# Patient Record
Sex: MALE | Race: OTHER | HISPANIC OR LATINO | URBAN - METROPOLITAN AREA
[De-identification: names, ages, dates, MRNs, and addresses within clinical notes are randomized per-mention and may not be internally consistent; named-entity substitution may affect disease eponyms.]

---

## 2017-01-27 ENCOUNTER — OUTPATIENT (OUTPATIENT)
Dept: OUTPATIENT SERVICES | Facility: HOSPITAL | Age: 37
LOS: 1 days | End: 2017-01-27

## 2017-01-27 ENCOUNTER — APPOINTMENT (OUTPATIENT)
Dept: OPHTHALMOLOGY | Facility: CLINIC | Age: 37
End: 2017-01-27

## 2017-02-03 ENCOUNTER — APPOINTMENT (OUTPATIENT)
Dept: OPHTHALMOLOGY | Facility: CLINIC | Age: 37
End: 2017-02-03

## 2017-02-08 DIAGNOSIS — H04.123 DRY EYE SYNDROME OF BILATERAL LACRIMAL GLANDS: ICD-10-CM

## 2017-04-13 ENCOUNTER — APPOINTMENT (OUTPATIENT)
Dept: OPHTHALMOLOGY | Facility: CLINIC | Age: 37
End: 2017-04-13

## 2017-04-13 ENCOUNTER — OUTPATIENT (OUTPATIENT)
Dept: OUTPATIENT SERVICES | Facility: HOSPITAL | Age: 37
LOS: 1 days | End: 2017-04-13

## 2017-07-21 DIAGNOSIS — H40.003 PREGLAUCOMA, UNSPECIFIED, BILATERAL: ICD-10-CM

## 2017-09-29 ENCOUNTER — OUTPATIENT (OUTPATIENT)
Dept: OUTPATIENT SERVICES | Facility: HOSPITAL | Age: 37
LOS: 1 days | End: 2017-09-29
Payer: SELF-PAY

## 2017-09-29 ENCOUNTER — APPOINTMENT (OUTPATIENT)
Dept: INTERNAL MEDICINE | Facility: CLINIC | Age: 37
End: 2017-09-29

## 2017-09-29 VITALS
DIASTOLIC BLOOD PRESSURE: 75 MMHG | HEART RATE: 72 BPM | TEMPERATURE: 99.3 F | BODY MASS INDEX: 26.07 KG/M2 | HEIGHT: 68 IN | RESPIRATION RATE: 14 BRPM | WEIGHT: 172 LBS | SYSTOLIC BLOOD PRESSURE: 122 MMHG

## 2017-09-29 DIAGNOSIS — I10 ESSENTIAL (PRIMARY) HYPERTENSION: ICD-10-CM

## 2017-09-29 PROCEDURE — G0463: CPT

## 2017-10-06 DIAGNOSIS — L02.31 CUTANEOUS ABSCESS OF BUTTOCK: ICD-10-CM

## 2017-10-06 DIAGNOSIS — I88.9 NONSPECIFIC LYMPHADENITIS, UNSPECIFIED: ICD-10-CM

## 2017-10-09 ENCOUNTER — EMERGENCY (EMERGENCY)
Facility: HOSPITAL | Age: 37
LOS: 1 days | Discharge: HOME | End: 2017-10-09
Attending: EMERGENCY MEDICINE | Admitting: EMERGENCY MEDICINE
Payer: MEDICAID

## 2017-10-09 VITALS
TEMPERATURE: 99 F | RESPIRATION RATE: 20 BRPM | HEART RATE: 79 BPM | SYSTOLIC BLOOD PRESSURE: 121 MMHG | OXYGEN SATURATION: 97 % | DIASTOLIC BLOOD PRESSURE: 62 MMHG

## 2017-10-09 VITALS
HEART RATE: 107 BPM | TEMPERATURE: 99 F | WEIGHT: 182.1 LBS | SYSTOLIC BLOOD PRESSURE: 127 MMHG | OXYGEN SATURATION: 100 % | RESPIRATION RATE: 16 BRPM | DIASTOLIC BLOOD PRESSURE: 83 MMHG

## 2017-10-09 LAB
ALBUMIN SERPL ELPH-MCNC: 4.7 G/DL — SIGNIFICANT CHANGE UP (ref 3.3–5)
ALP SERPL-CCNC: 91 U/L — SIGNIFICANT CHANGE UP (ref 40–120)
ALT FLD-CCNC: 25 U/L RC — SIGNIFICANT CHANGE UP (ref 10–45)
ANION GAP SERPL CALC-SCNC: 15 MMOL/L — SIGNIFICANT CHANGE UP (ref 5–17)
APPEARANCE UR: ABNORMAL
AST SERPL-CCNC: 18 U/L — SIGNIFICANT CHANGE UP (ref 10–40)
BACTERIA # UR AUTO: ABNORMAL /HPF
BASOPHILS # BLD AUTO: 0 K/UL — SIGNIFICANT CHANGE UP (ref 0–0.2)
BASOPHILS NFR BLD AUTO: 0 % — SIGNIFICANT CHANGE UP (ref 0–2)
BILIRUB SERPL-MCNC: 0.4 MG/DL — SIGNIFICANT CHANGE UP (ref 0.2–1.2)
BILIRUB UR-MCNC: NEGATIVE — SIGNIFICANT CHANGE UP
BUN SERPL-MCNC: 14 MG/DL — SIGNIFICANT CHANGE UP (ref 7–23)
CALCIUM SERPL-MCNC: 9.8 MG/DL — SIGNIFICANT CHANGE UP (ref 8.4–10.5)
CHLORIDE SERPL-SCNC: 99 MMOL/L — SIGNIFICANT CHANGE UP (ref 96–108)
CO2 SERPL-SCNC: 27 MMOL/L — SIGNIFICANT CHANGE UP (ref 22–31)
COLOR SPEC: YELLOW — SIGNIFICANT CHANGE UP
CREAT SERPL-MCNC: 0.98 MG/DL — SIGNIFICANT CHANGE UP (ref 0.5–1.3)
DIFF PNL FLD: ABNORMAL
EOSINOPHIL # BLD AUTO: 0.1 K/UL — SIGNIFICANT CHANGE UP (ref 0–0.5)
EOSINOPHIL NFR BLD AUTO: 0.4 % — SIGNIFICANT CHANGE UP (ref 0–6)
EPI CELLS # UR: SIGNIFICANT CHANGE UP /HPF
GAS PNL BLDV: SIGNIFICANT CHANGE UP
GLUCOSE SERPL-MCNC: 99 MG/DL — SIGNIFICANT CHANGE UP (ref 70–99)
GLUCOSE UR QL: NEGATIVE — SIGNIFICANT CHANGE UP
HCT VFR BLD CALC: 45.1 % — SIGNIFICANT CHANGE UP (ref 39–50)
HGB BLD-MCNC: 16 G/DL — SIGNIFICANT CHANGE UP (ref 13–17)
KETONES UR-MCNC: NEGATIVE — SIGNIFICANT CHANGE UP
LEUKOCYTE ESTERASE UR-ACNC: ABNORMAL
LYMPHOCYTES # BLD AUTO: 1.9 K/UL — SIGNIFICANT CHANGE UP (ref 1–3.3)
LYMPHOCYTES # BLD AUTO: 9.7 % — LOW (ref 13–44)
MCHC RBC-ENTMCNC: 32.3 PG — SIGNIFICANT CHANGE UP (ref 27–34)
MCHC RBC-ENTMCNC: 35.5 GM/DL — SIGNIFICANT CHANGE UP (ref 32–36)
MCV RBC AUTO: 91.2 FL — SIGNIFICANT CHANGE UP (ref 80–100)
MONOCYTES # BLD AUTO: 1.2 K/UL — HIGH (ref 0–0.9)
MONOCYTES NFR BLD AUTO: 5.9 % — SIGNIFICANT CHANGE UP (ref 2–14)
NEUTROPHILS # BLD AUTO: 16.5 K/UL — HIGH (ref 1.8–7.4)
NEUTROPHILS NFR BLD AUTO: 84 % — HIGH (ref 43–77)
NITRITE UR-MCNC: NEGATIVE — SIGNIFICANT CHANGE UP
PH UR: 6.5 — SIGNIFICANT CHANGE UP (ref 5–8)
PLATELET # BLD AUTO: 115 K/UL — LOW (ref 150–400)
POTASSIUM SERPL-MCNC: 4.2 MMOL/L — SIGNIFICANT CHANGE UP (ref 3.5–5.3)
POTASSIUM SERPL-SCNC: 4.2 MMOL/L — SIGNIFICANT CHANGE UP (ref 3.5–5.3)
PROT SERPL-MCNC: 7.9 G/DL — SIGNIFICANT CHANGE UP (ref 6–8.3)
PROT UR-MCNC: 150 MG/DL
RBC # BLD: 4.94 M/UL — SIGNIFICANT CHANGE UP (ref 4.2–5.8)
RBC # FLD: 11.4 % — SIGNIFICANT CHANGE UP (ref 10.3–14.5)
RBC CASTS # UR COMP ASSIST: ABNORMAL /HPF (ref 0–2)
SODIUM SERPL-SCNC: 141 MMOL/L — SIGNIFICANT CHANGE UP (ref 135–145)
SP GR SPEC: 1.02 — SIGNIFICANT CHANGE UP (ref 1.01–1.02)
UROBILINOGEN FLD QL: NEGATIVE — SIGNIFICANT CHANGE UP
WBC # BLD: 18.3 K/UL — HIGH (ref 3.8–10.5)
WBC # FLD AUTO: 18.3 K/UL — HIGH (ref 3.8–10.5)
WBC UR QL: >50 /HPF (ref 0–5)

## 2017-10-09 PROCEDURE — 84295 ASSAY OF SERUM SODIUM: CPT

## 2017-10-09 PROCEDURE — 96372 THER/PROPH/DIAG INJ SC/IM: CPT

## 2017-10-09 PROCEDURE — 74176 CT ABD & PELVIS W/O CONTRAST: CPT | Mod: 26

## 2017-10-09 PROCEDURE — 85027 COMPLETE CBC AUTOMATED: CPT

## 2017-10-09 PROCEDURE — 82435 ASSAY OF BLOOD CHLORIDE: CPT

## 2017-10-09 PROCEDURE — 81001 URINALYSIS AUTO W/SCOPE: CPT

## 2017-10-09 PROCEDURE — 85014 HEMATOCRIT: CPT

## 2017-10-09 PROCEDURE — 87389 HIV-1 AG W/HIV-1&-2 AB AG IA: CPT

## 2017-10-09 PROCEDURE — 74176 CT ABD & PELVIS W/O CONTRAST: CPT

## 2017-10-09 PROCEDURE — 83605 ASSAY OF LACTIC ACID: CPT

## 2017-10-09 PROCEDURE — 99285 EMERGENCY DEPT VISIT HI MDM: CPT

## 2017-10-09 PROCEDURE — 85049 AUTOMATED PLATELET COUNT: CPT

## 2017-10-09 PROCEDURE — 80053 COMPREHEN METABOLIC PANEL: CPT

## 2017-10-09 PROCEDURE — 82947 ASSAY GLUCOSE BLOOD QUANT: CPT

## 2017-10-09 PROCEDURE — 82803 BLOOD GASES ANY COMBINATION: CPT

## 2017-10-09 PROCEDURE — 99284 EMERGENCY DEPT VISIT MOD MDM: CPT | Mod: 25

## 2017-10-09 PROCEDURE — 84132 ASSAY OF SERUM POTASSIUM: CPT

## 2017-10-09 PROCEDURE — 86780 TREPONEMA PALLIDUM: CPT

## 2017-10-09 PROCEDURE — 82330 ASSAY OF CALCIUM: CPT

## 2017-10-09 PROCEDURE — 87186 SC STD MICRODIL/AGAR DIL: CPT

## 2017-10-09 PROCEDURE — 87086 URINE CULTURE/COLONY COUNT: CPT

## 2017-10-09 RX ORDER — SODIUM CHLORIDE 9 MG/ML
1000 INJECTION INTRAMUSCULAR; INTRAVENOUS; SUBCUTANEOUS ONCE
Qty: 0 | Refills: 0 | Status: COMPLETED | OUTPATIENT
Start: 2017-10-09 | End: 2017-10-09

## 2017-10-09 RX ORDER — AZITHROMYCIN 500 MG/1
1000 TABLET, FILM COATED ORAL ONCE
Qty: 0 | Refills: 0 | Status: COMPLETED | OUTPATIENT
Start: 2017-10-09 | End: 2017-10-09

## 2017-10-09 RX ORDER — CEFTRIAXONE 500 MG/1
250 INJECTION, POWDER, FOR SOLUTION INTRAMUSCULAR; INTRAVENOUS ONCE
Qty: 0 | Refills: 0 | Status: COMPLETED | OUTPATIENT
Start: 2017-10-09 | End: 2017-10-09

## 2017-10-09 RX ADMIN — CEFTRIAXONE 250 MILLIGRAM(S): 500 INJECTION, POWDER, FOR SOLUTION INTRAMUSCULAR; INTRAVENOUS at 20:54

## 2017-10-09 RX ADMIN — AZITHROMYCIN 1000 MILLIGRAM(S): 500 TABLET, FILM COATED ORAL at 20:53

## 2017-10-09 RX ADMIN — SODIUM CHLORIDE 1000 MILLILITER(S): 9 INJECTION INTRAMUSCULAR; INTRAVENOUS; SUBCUTANEOUS at 18:47

## 2017-10-09 NOTE — ED PROCEDURE NOTE - PROCEDURE ADDITIONAL DETAILS
Emergency Department Focused Ultrasound performed at patient's bedside for educational purposes. The study will have a follow up study performed or was performed in the direct supervision of an ultrasound trained attending.     Thickened bladder wall. no hydro. No signs appendicitis RLQ.

## 2017-10-09 NOTE — ED PROVIDER NOTE - PHYSICAL EXAMINATION
Gen: WDWN, NAD  HEENT: EOMI, no nasal discharge, mucous membranes moist  CV: regular but tachycardic, +S1/S2, no M/R/G  Resp: CTAB, no W/R/R  GI: Abdomen soft non-distended, mild tenderness to palpation suprapubic, no masses  : No gross deformity of the penis or testicles, no pain with palpation of bilateral testicles, prominent left inguinal lymphadenopathy along entire inguinal chain, no open lesions or sores, no active penile discharge, left buttock with mild abrasion without fluctuance or induration  MSK: No open wounds, no bruising, no LE edema  Neuro: A&Ox4, following commands, moving all four extremities spontaneously  Psych: appropriate mood, denies AH, VH, SI

## 2017-10-09 NOTE — ED PROVIDER NOTE - ATTENDING CONTRIBUTION TO CARE
pt with dysuria ad lower abd pain/malaise  on exam, ill appearing. lower abd mild discomfort.   likely UTI. ct r/o infected stone. pt signed out with results pending.

## 2017-10-09 NOTE — ED ADULT NURSE NOTE - OBJECTIVE STATEMENT
38 yo male presents to ED with several hours penile discharge, hematuria, and burning urination. Patient also reporting left groin pain and tenderness to palpation, +swollen and tender lymph nodes present down left groin. Patient recently prescribed bactrim to wound on left buttock that is healing well. +Chills, +one episode of vomiting yesterday. No chest pain or SOB. No abdominal pain, no diarrhea. No rashes, no ulcerations, no testicular pain or swelling. Patient is , sexually active only with wife.

## 2017-10-09 NOTE — ED PROVIDER NOTE - MEDICAL DECISION MAKING DETAILS
patient with discharge and UA concerning for urinary infection vs STD, patient was treated empirically for STD, clumped platelets were resent in blue top and still clumped, hematology said that the patient will be fine to follow up regarding this as an outpatient.

## 2017-10-09 NOTE — ED PROVIDER NOTE - NS ED ROS FT
Gen: Denies fever, weight loss  CV: Denies chest pain, palpitations  Skin: Denies rash, erythema, color changes  Resp: Denies SOB, cough  Endo: Denies sensitivity to heat, cold, increased urination  GI: Admits to vomiting, Denies constipation, nausea  Msk: Denies back pain, LE swelling, extremity pain  : Admits to dysuria, increased frequency  Neuro: Denies LOC, weakness, seizures  Psych: Denies hx of psych, hallucinations

## 2017-10-09 NOTE — ED PROVIDER NOTE - OBJECTIVE STATEMENT
Patient is a 37 year old male with no PMHx who presents with pain in his penis, burning with urination, hematuria, and penile discharge for 4 hours. He states that 5 years ago he had a similar problem but doesn't remember what the cause was. 2 weeks ago he was seen in urgent care for a lesion on his right buttock and was given an rx for bactrim. He admits to vomiting x 1 an hour ago. He denies fever, chest pain, SOB, or joint pain. When the patient's wife leaves the room he states that his wife is his only sexual partner and that he does not use any drugs or have any other high risk activity.

## 2017-10-10 ENCOUNTER — INPATIENT (INPATIENT)
Facility: HOSPITAL | Age: 37
LOS: 2 days | Discharge: HOME | DRG: 872 | End: 2017-10-13
Attending: HOSPITALIST | Admitting: STUDENT IN AN ORGANIZED HEALTH CARE EDUCATION/TRAINING PROGRAM
Payer: MEDICAID

## 2017-10-10 VITALS
SYSTOLIC BLOOD PRESSURE: 130 MMHG | HEART RATE: 122 BPM | TEMPERATURE: 101 F | RESPIRATION RATE: 19 BRPM | DIASTOLIC BLOOD PRESSURE: 40 MMHG | OXYGEN SATURATION: 99 %

## 2017-10-10 LAB
ALBUMIN SERPL ELPH-MCNC: 4.3 G/DL — SIGNIFICANT CHANGE UP (ref 3.3–5)
ALP SERPL-CCNC: 85 U/L — SIGNIFICANT CHANGE UP (ref 40–120)
ALT FLD-CCNC: 24 U/L RC — SIGNIFICANT CHANGE UP (ref 10–45)
ANION GAP SERPL CALC-SCNC: 14 MMOL/L — SIGNIFICANT CHANGE UP (ref 5–17)
AST SERPL-CCNC: 25 U/L — SIGNIFICANT CHANGE UP (ref 10–40)
BASOPHILS # BLD AUTO: 0 K/UL — SIGNIFICANT CHANGE UP (ref 0–0.2)
BASOPHILS NFR BLD AUTO: 0.1 % — SIGNIFICANT CHANGE UP (ref 0–2)
BILIRUB SERPL-MCNC: 0.8 MG/DL — SIGNIFICANT CHANGE UP (ref 0.2–1.2)
BUN SERPL-MCNC: 12 MG/DL — SIGNIFICANT CHANGE UP (ref 7–23)
C TRACH RRNA SPEC QL NAA+PROBE: SIGNIFICANT CHANGE UP
CALCIUM SERPL-MCNC: 9.4 MG/DL — SIGNIFICANT CHANGE UP (ref 8.4–10.5)
CHLORIDE SERPL-SCNC: 100 MMOL/L — SIGNIFICANT CHANGE UP (ref 96–108)
CO2 SERPL-SCNC: 24 MMOL/L — SIGNIFICANT CHANGE UP (ref 22–31)
CREAT SERPL-MCNC: 1.16 MG/DL — SIGNIFICANT CHANGE UP (ref 0.5–1.3)
EOSINOPHIL # BLD AUTO: 0 K/UL — SIGNIFICANT CHANGE UP (ref 0–0.5)
EOSINOPHIL NFR BLD AUTO: 0.2 % — SIGNIFICANT CHANGE UP (ref 0–6)
GAS PNL BLDV: SIGNIFICANT CHANGE UP
GLUCOSE SERPL-MCNC: 110 MG/DL — HIGH (ref 70–99)
HCT VFR BLD CALC: 43.3 % — SIGNIFICANT CHANGE UP (ref 39–50)
HGB BLD-MCNC: 14.9 G/DL — SIGNIFICANT CHANGE UP (ref 13–17)
HIV 1+2 AB+HIV1 P24 AG SERPL QL IA: SIGNIFICANT CHANGE UP
LYMPHOCYTES # BLD AUTO: 1.7 K/UL — SIGNIFICANT CHANGE UP (ref 1–3.3)
LYMPHOCYTES # BLD AUTO: 8.8 % — LOW (ref 13–44)
MCHC RBC-ENTMCNC: 31.5 PG — SIGNIFICANT CHANGE UP (ref 27–34)
MCHC RBC-ENTMCNC: 34.4 GM/DL — SIGNIFICANT CHANGE UP (ref 32–36)
MCV RBC AUTO: 91.5 FL — SIGNIFICANT CHANGE UP (ref 80–100)
MONOCYTES # BLD AUTO: 1.6 K/UL — HIGH (ref 0–0.9)
MONOCYTES NFR BLD AUTO: 8.3 % — SIGNIFICANT CHANGE UP (ref 2–14)
N GONORRHOEA RRNA SPEC QL NAA+PROBE: SIGNIFICANT CHANGE UP
NEUTROPHILS # BLD AUTO: 16.1 K/UL — HIGH (ref 1.8–7.4)
NEUTROPHILS NFR BLD AUTO: 82.7 % — HIGH (ref 43–77)
PLATELET # BLD AUTO: ABNORMAL (ref 150–400)
POTASSIUM SERPL-MCNC: 4.2 MMOL/L — SIGNIFICANT CHANGE UP (ref 3.5–5.3)
POTASSIUM SERPL-SCNC: 4.2 MMOL/L — SIGNIFICANT CHANGE UP (ref 3.5–5.3)
PROT SERPL-MCNC: 8 G/DL — SIGNIFICANT CHANGE UP (ref 6–8.3)
RBC # BLD: 4.73 M/UL — SIGNIFICANT CHANGE UP (ref 4.2–5.8)
RBC # FLD: 11.6 % — SIGNIFICANT CHANGE UP (ref 10.3–14.5)
SODIUM SERPL-SCNC: 138 MMOL/L — SIGNIFICANT CHANGE UP (ref 135–145)
SPECIMEN SOURCE: SIGNIFICANT CHANGE UP
WBC # BLD: 19.5 K/UL — HIGH (ref 3.8–10.5)
WBC # FLD AUTO: 19.5 K/UL — HIGH (ref 3.8–10.5)

## 2017-10-10 PROCEDURE — 76775 US EXAM ABDO BACK WALL LIM: CPT | Mod: 26

## 2017-10-10 PROCEDURE — 99285 EMERGENCY DEPT VISIT HI MDM: CPT

## 2017-10-10 RX ORDER — SODIUM CHLORIDE 9 MG/ML
1000 INJECTION INTRAMUSCULAR; INTRAVENOUS; SUBCUTANEOUS ONCE
Qty: 0 | Refills: 0 | Status: COMPLETED | OUTPATIENT
Start: 2017-10-10 | End: 2017-10-10

## 2017-10-10 RX ORDER — CEFTRIAXONE 500 MG/1
1 INJECTION, POWDER, FOR SOLUTION INTRAMUSCULAR; INTRAVENOUS EVERY 12 HOURS
Qty: 0 | Refills: 0 | Status: DISCONTINUED | OUTPATIENT
Start: 2017-10-10 | End: 2017-10-11

## 2017-10-10 RX ORDER — SODIUM CHLORIDE 9 MG/ML
1000 INJECTION INTRAMUSCULAR; INTRAVENOUS; SUBCUTANEOUS
Qty: 0 | Refills: 0 | Status: DISCONTINUED | OUTPATIENT
Start: 2017-10-10 | End: 2017-10-11

## 2017-10-10 RX ORDER — CEFTRIAXONE 500 MG/1
1 INJECTION, POWDER, FOR SOLUTION INTRAMUSCULAR; INTRAVENOUS ONCE
Qty: 0 | Refills: 0 | Status: COMPLETED | OUTPATIENT
Start: 2017-10-10 | End: 2017-10-10

## 2017-10-10 RX ORDER — ONDANSETRON 8 MG/1
4 TABLET, FILM COATED ORAL ONCE
Qty: 0 | Refills: 0 | Status: COMPLETED | OUTPATIENT
Start: 2017-10-10 | End: 2017-10-10

## 2017-10-10 RX ORDER — KETOROLAC TROMETHAMINE 30 MG/ML
15 SYRINGE (ML) INJECTION ONCE
Qty: 0 | Refills: 0 | Status: DISCONTINUED | OUTPATIENT
Start: 2017-10-10 | End: 2017-10-10

## 2017-10-10 RX ORDER — ACETAMINOPHEN 500 MG
1000 TABLET ORAL EVERY 6 HOURS
Qty: 0 | Refills: 0 | Status: COMPLETED | OUTPATIENT
Start: 2017-10-10 | End: 2017-10-11

## 2017-10-10 RX ORDER — KETOROLAC TROMETHAMINE 30 MG/ML
30 SYRINGE (ML) INJECTION EVERY 8 HOURS
Qty: 0 | Refills: 0 | Status: DISCONTINUED | OUTPATIENT
Start: 2017-10-10 | End: 2017-10-11

## 2017-10-10 RX ORDER — ACETAMINOPHEN 500 MG
1000 TABLET ORAL ONCE
Qty: 0 | Refills: 0 | Status: COMPLETED | OUTPATIENT
Start: 2017-10-10 | End: 2017-10-10

## 2017-10-10 RX ADMIN — Medication 15 MILLIGRAM(S): at 23:15

## 2017-10-10 RX ADMIN — CEFTRIAXONE 100 GRAM(S): 500 INJECTION, POWDER, FOR SOLUTION INTRAMUSCULAR; INTRAVENOUS at 22:17

## 2017-10-10 RX ADMIN — Medication 400 MILLIGRAM(S): at 21:43

## 2017-10-10 RX ADMIN — SODIUM CHLORIDE 1000 MILLILITER(S): 9 INJECTION INTRAMUSCULAR; INTRAVENOUS; SUBCUTANEOUS at 23:14

## 2017-10-10 RX ADMIN — SODIUM CHLORIDE 1000 MILLILITER(S): 9 INJECTION INTRAMUSCULAR; INTRAVENOUS; SUBCUTANEOUS at 21:44

## 2017-10-10 RX ADMIN — ONDANSETRON 4 MILLIGRAM(S): 8 TABLET, FILM COATED ORAL at 21:43

## 2017-10-10 RX ADMIN — SODIUM CHLORIDE 1000 MILLILITER(S): 9 INJECTION INTRAMUSCULAR; INTRAVENOUS; SUBCUTANEOUS at 22:38

## 2017-10-10 RX ADMIN — Medication 15 MILLIGRAM(S): at 23:14

## 2017-10-10 RX ADMIN — Medication 15 MILLIGRAM(S): at 23:59

## 2017-10-10 RX ADMIN — Medication 15 MILLIGRAM(S): at 22:17

## 2017-10-10 NOTE — ED PROVIDER NOTE - MEDICAL DECISION MAKING DETAILS
Attending Cuellar: 38 y/o male h/o UTI in the past presenting with fever and dysuria. upon arrival to the ed pt febrile with suprapubic discomfort. pt also with vomiting and diarrhea. abd soft on exam. pt had ct abd/pel yesterday which was negative for renal stone making septic stone unlikely. suspect pyelonephritis. plan for continued IVF and abx. no testicular pain or evidence of demetris Attending Cuellar: 36 y/o male h/o UTI in the past presenting with fever and dysuria. upon arrival to the ed pt febrile with suprapubic discomfort. pt also with vomiting and diarrhea. abd soft on exam. pt had ct abd/pel yesterday which was negative for renal stone making septic stone unlikely. suspect pyelonephritis. plan for continued IVF and abx. no testicular pain or evidence of demetris. pt states has had uti in the past.  no h/o ureteral reflux. not h/o immunosuppression

## 2017-10-10 NOTE — ED PROVIDER NOTE - PHYSICAL EXAMINATION
Attending Cuellar: Gen: NAD, heent: atrauamtic, eomi, perrla, mmm, op pink, uvula midline, neck; nttp, no nuchal rigidity, chest: nttp, no crepitus, cv: rrr, no murmurs, lungs: ctab, abd: soft, nontender, nondistended, no peritoneal signs, +BS, no guarding, ext: wwp, neg homans, skin: no rash, neuro: awake and alert, following commands, speech clear, sensation and strength intact, no focal deficits : no testicular pain or ttp. no crepitus. circumszied

## 2017-10-10 NOTE — ED PROVIDER NOTE - PROGRESS NOTE DETAILS
Attending Polo: pt placed in the cdu for pyelonephritis. on re-exam pt more ill appearing. has received 4L IVF with persistent hypotension. pt will need admission, continued aggressive hydration and abx. will repeat lactate. plan to admit

## 2017-10-10 NOTE — ED ADULT NURSE NOTE - OBJECTIVE STATEMENT
37  year old  male aox3 ambulatory wife at bedside presnting to ed with generalized malaise, body aches fever and difficulty urinating x couple of days. patient states he was seen here yesterday and tx wit "a shot and 2 pills" and was not sent home on po abts. 37  year old  male aox3 ambulatory wife at bedside presenting to ed with generalized malaise, body aches fever and difficulty urinating x couple of days. patient states he was seen here yesterday and tx wit "a shot and 2 pills" and states he was not sent home on po abts. patient states he is continuing to have urinary dysuria, hematuria and frequency as well as worsening body aches. respirations even unlabored no sob/dyspnea abd soft nondistended +bs nontender skin dry warm intact crews equally

## 2017-10-11 DIAGNOSIS — A41.51 SEPSIS DUE TO ESCHERICHIA COLI [E. COLI]: ICD-10-CM

## 2017-10-11 DIAGNOSIS — Z98.890 OTHER SPECIFIED POSTPROCEDURAL STATES: Chronic | ICD-10-CM

## 2017-10-11 DIAGNOSIS — N17.9 ACUTE KIDNEY FAILURE, UNSPECIFIED: ICD-10-CM

## 2017-10-11 DIAGNOSIS — N12 TUBULO-INTERSTITIAL NEPHRITIS, NOT SPECIFIED AS ACUTE OR CHRONIC: ICD-10-CM

## 2017-10-11 DIAGNOSIS — R79.89 OTHER SPECIFIED ABNORMAL FINDINGS OF BLOOD CHEMISTRY: ICD-10-CM

## 2017-10-11 DIAGNOSIS — I95.9 HYPOTENSION, UNSPECIFIED: ICD-10-CM

## 2017-10-11 DIAGNOSIS — A41.9 SEPSIS, UNSPECIFIED ORGANISM: ICD-10-CM

## 2017-10-11 DIAGNOSIS — Z29.9 ENCOUNTER FOR PROPHYLACTIC MEASURES, UNSPECIFIED: ICD-10-CM

## 2017-10-11 LAB
-  AMIKACIN: SIGNIFICANT CHANGE UP
-  AMPICILLIN/SULBACTAM: SIGNIFICANT CHANGE UP
-  AMPICILLIN: SIGNIFICANT CHANGE UP
-  AZTREONAM: SIGNIFICANT CHANGE UP
-  CEFAZOLIN: SIGNIFICANT CHANGE UP
-  CEFEPIME: SIGNIFICANT CHANGE UP
-  CEFOXITIN: SIGNIFICANT CHANGE UP
-  CEFTAZIDIME: SIGNIFICANT CHANGE UP
-  CEFTRIAXONE: SIGNIFICANT CHANGE UP
-  CIPROFLOXACIN: SIGNIFICANT CHANGE UP
-  ERTAPENEM: SIGNIFICANT CHANGE UP
-  GENTAMICIN: SIGNIFICANT CHANGE UP
-  IMIPENEM: SIGNIFICANT CHANGE UP
-  LEVOFLOXACIN: SIGNIFICANT CHANGE UP
-  MEROPENEM: SIGNIFICANT CHANGE UP
-  NITROFURANTOIN: SIGNIFICANT CHANGE UP
-  PIPERACILLIN/TAZOBACTAM: SIGNIFICANT CHANGE UP
-  TOBRAMYCIN: SIGNIFICANT CHANGE UP
-  TRIMETHOPRIM/SULFAMETHOXAZOLE: SIGNIFICANT CHANGE UP
ALBUMIN SERPL ELPH-MCNC: 3.1 G/DL — LOW (ref 3.3–5)
ALBUMIN SERPL ELPH-MCNC: 3.2 G/DL — LOW (ref 3.3–5)
ALP SERPL-CCNC: 64 U/L — SIGNIFICANT CHANGE UP (ref 40–120)
ALP SERPL-CCNC: 87 U/L — SIGNIFICANT CHANGE UP (ref 40–120)
ALT FLD-CCNC: 18 U/L RC — SIGNIFICANT CHANGE UP (ref 10–45)
ALT FLD-CCNC: 22 U/L RC — SIGNIFICANT CHANGE UP (ref 10–45)
AMPHET UR-MCNC: NEGATIVE — SIGNIFICANT CHANGE UP
ANION GAP SERPL CALC-SCNC: 10 MMOL/L — SIGNIFICANT CHANGE UP (ref 5–17)
ANION GAP SERPL CALC-SCNC: 11 MMOL/L — SIGNIFICANT CHANGE UP (ref 5–17)
APPEARANCE UR: CLEAR — SIGNIFICANT CHANGE UP
APTT BLD: 25.6 SEC — LOW (ref 27.5–37.4)
APTT BLD: 28.1 SEC — SIGNIFICANT CHANGE UP (ref 27.5–37.4)
AST SERPL-CCNC: 17 U/L — SIGNIFICANT CHANGE UP (ref 10–40)
AST SERPL-CCNC: 22 U/L — SIGNIFICANT CHANGE UP (ref 10–40)
BACTERIA # UR AUTO: ABNORMAL /HPF
BARBITURATES UR SCN-MCNC: NEGATIVE — SIGNIFICANT CHANGE UP
BASE EXCESS BLDV CALC-SCNC: -3.6 MMOL/L — LOW (ref -2–2)
BASOPHILS # BLD AUTO: 0 K/UL — SIGNIFICANT CHANGE UP (ref 0–0.2)
BASOPHILS NFR BLD AUTO: 0.1 % — SIGNIFICANT CHANGE UP (ref 0–2)
BENZODIAZ UR-MCNC: NEGATIVE — SIGNIFICANT CHANGE UP
BILIRUB SERPL-MCNC: 0.6 MG/DL — SIGNIFICANT CHANGE UP (ref 0.2–1.2)
BILIRUB SERPL-MCNC: 1.5 MG/DL — HIGH (ref 0.2–1.2)
BILIRUB UR-MCNC: NEGATIVE — SIGNIFICANT CHANGE UP
BLD GP AB SCN SERPL QL: NEGATIVE — SIGNIFICANT CHANGE UP
BUN SERPL-MCNC: 10 MG/DL — SIGNIFICANT CHANGE UP (ref 7–23)
BUN SERPL-MCNC: 10 MG/DL — SIGNIFICANT CHANGE UP (ref 7–23)
CA-I SERPL-SCNC: 1.12 MMOL/L — SIGNIFICANT CHANGE UP (ref 1.12–1.3)
CALCIUM SERPL-MCNC: 7.5 MG/DL — LOW (ref 8.4–10.5)
CALCIUM SERPL-MCNC: 7.9 MG/DL — LOW (ref 8.4–10.5)
CHLORIDE BLDV-SCNC: 112 MMOL/L — HIGH (ref 96–108)
CHLORIDE SERPL-SCNC: 108 MMOL/L — SIGNIFICANT CHANGE UP (ref 96–108)
CHLORIDE SERPL-SCNC: 110 MMOL/L — HIGH (ref 96–108)
CO2 BLDV-SCNC: 23 MMOL/L — SIGNIFICANT CHANGE UP (ref 22–30)
CO2 SERPL-SCNC: 20 MMOL/L — LOW (ref 22–31)
CO2 SERPL-SCNC: 21 MMOL/L — LOW (ref 22–31)
COCAINE METAB.OTHER UR-MCNC: NEGATIVE — SIGNIFICANT CHANGE UP
COLOR SPEC: YELLOW — SIGNIFICANT CHANGE UP
COMMENT - URINE: SIGNIFICANT CHANGE UP
CREAT SERPL-MCNC: 0.91 MG/DL — SIGNIFICANT CHANGE UP (ref 0.5–1.3)
CREAT SERPL-MCNC: 0.94 MG/DL — SIGNIFICANT CHANGE UP (ref 0.5–1.3)
CULTURE RESULTS: SIGNIFICANT CHANGE UP
DIFF PNL FLD: ABNORMAL
EOSINOPHIL # BLD AUTO: 0 K/UL — SIGNIFICANT CHANGE UP (ref 0–0.5)
EOSINOPHIL NFR BLD AUTO: 0.1 % — SIGNIFICANT CHANGE UP (ref 0–6)
EPI CELLS # UR: SIGNIFICANT CHANGE UP /HPF
FIBRINOGEN PPP-MCNC: 918 MG/DL — HIGH (ref 310–510)
FSP PPP-MCNC: >=5 <20
GAS PNL BLDV: 139 MMOL/L — SIGNIFICANT CHANGE UP (ref 136–145)
GAS PNL BLDV: SIGNIFICANT CHANGE UP
GLUCOSE BLDV-MCNC: 102 MG/DL — HIGH (ref 70–99)
GLUCOSE SERPL-MCNC: 104 MG/DL — HIGH (ref 70–99)
GLUCOSE SERPL-MCNC: 108 MG/DL — HIGH (ref 70–99)
GLUCOSE UR QL: NEGATIVE — SIGNIFICANT CHANGE UP
HAPTOGLOB SERPL-MCNC: 232 MG/DL — HIGH (ref 34–200)
HCO3 BLDV-SCNC: 22 MMOL/L — SIGNIFICANT CHANGE UP (ref 21–29)
HCT VFR BLD CALC: 35.9 % — LOW (ref 39–50)
HCT VFR BLD CALC: 37.5 % — LOW (ref 39–50)
HCT VFR BLDA CALC: 40 % — SIGNIFICANT CHANGE UP (ref 39–50)
HGB BLD CALC-MCNC: 12.9 G/DL — LOW (ref 13–17)
HGB BLD-MCNC: 12.6 G/DL — LOW (ref 13–17)
HGB BLD-MCNC: 12.7 G/DL — LOW (ref 13–17)
INR BLD: 1.17 RATIO — HIGH (ref 0.88–1.16)
INR BLD: 1.25 RATIO — HIGH (ref 0.88–1.16)
KETONES UR-MCNC: ABNORMAL
LACTATE BLDV-MCNC: 0.7 MMOL/L — SIGNIFICANT CHANGE UP (ref 0.7–2)
LDH SERPL L TO P-CCNC: 291 U/L — HIGH (ref 50–242)
LEUKOCYTE ESTERASE UR-ACNC: ABNORMAL
LYMPHOCYTES # BLD AUTO: 1.4 K/UL — SIGNIFICANT CHANGE UP (ref 1–3.3)
LYMPHOCYTES # BLD AUTO: 9.1 % — LOW (ref 13–44)
MAGNESIUM SERPL-MCNC: 1.6 MG/DL — SIGNIFICANT CHANGE UP (ref 1.6–2.6)
MCHC RBC-ENTMCNC: 31.4 PG — SIGNIFICANT CHANGE UP (ref 27–34)
MCHC RBC-ENTMCNC: 32.3 PG — SIGNIFICANT CHANGE UP (ref 27–34)
MCHC RBC-ENTMCNC: 33.9 GM/DL — SIGNIFICANT CHANGE UP (ref 32–36)
MCHC RBC-ENTMCNC: 35.1 GM/DL — SIGNIFICANT CHANGE UP (ref 32–36)
MCV RBC AUTO: 91.9 FL — SIGNIFICANT CHANGE UP (ref 80–100)
MCV RBC AUTO: 92.5 FL — SIGNIFICANT CHANGE UP (ref 80–100)
METHADONE UR-MCNC: NEGATIVE — SIGNIFICANT CHANGE UP
METHOD TYPE: SIGNIFICANT CHANGE UP
MONOCYTES # BLD AUTO: 0.5 K/UL — SIGNIFICANT CHANGE UP (ref 0–0.9)
MONOCYTES NFR BLD AUTO: 3.4 % — SIGNIFICANT CHANGE UP (ref 2–14)
NEUTROPHILS # BLD AUTO: 13.2 K/UL — HIGH (ref 1.8–7.4)
NEUTROPHILS NFR BLD AUTO: 87.4 % — HIGH (ref 43–77)
NITRITE UR-MCNC: NEGATIVE — SIGNIFICANT CHANGE UP
OPIATES UR-MCNC: NEGATIVE — SIGNIFICANT CHANGE UP
ORGANISM # SPEC MICROSCOPIC CNT: SIGNIFICANT CHANGE UP
ORGANISM # SPEC MICROSCOPIC CNT: SIGNIFICANT CHANGE UP
OXYCODONE UR-MCNC: NEGATIVE — SIGNIFICANT CHANGE UP
PCO2 BLDV: 43 MMHG — SIGNIFICANT CHANGE UP (ref 35–50)
PCP SPEC-MCNC: SIGNIFICANT CHANGE UP
PCP UR-MCNC: NEGATIVE — SIGNIFICANT CHANGE UP
PH BLDV: 7.33 — LOW (ref 7.35–7.45)
PH UR: 6 — SIGNIFICANT CHANGE UP (ref 5–8)
PHOSPHATE SERPL-MCNC: 1.4 MG/DL — LOW (ref 2.5–4.5)
PLATELET # BLD AUTO: ABNORMAL (ref 150–400)
PLATELET # BLD AUTO: ABNORMAL (ref 150–400)
PO2 BLDV: 54 MMHG — HIGH (ref 25–45)
POTASSIUM BLDV-SCNC: 3.5 MMOL/L — SIGNIFICANT CHANGE UP (ref 3.5–5)
POTASSIUM SERPL-MCNC: 3.7 MMOL/L — SIGNIFICANT CHANGE UP (ref 3.5–5.3)
POTASSIUM SERPL-MCNC: 3.8 MMOL/L — SIGNIFICANT CHANGE UP (ref 3.5–5.3)
POTASSIUM SERPL-SCNC: 3.7 MMOL/L — SIGNIFICANT CHANGE UP (ref 3.5–5.3)
POTASSIUM SERPL-SCNC: 3.8 MMOL/L — SIGNIFICANT CHANGE UP (ref 3.5–5.3)
PROT SERPL-MCNC: 5.9 G/DL — LOW (ref 6–8.3)
PROT SERPL-MCNC: 5.9 G/DL — LOW (ref 6–8.3)
PROT UR-MCNC: SIGNIFICANT CHANGE UP
PROTHROM AB SERPL-ACNC: 12.7 SEC — SIGNIFICANT CHANGE UP (ref 9.8–12.7)
PROTHROM AB SERPL-ACNC: 13.7 SEC — HIGH (ref 9.8–12.7)
RAPID RVP RESULT: SIGNIFICANT CHANGE UP
RBC # BLD: 3.91 M/UL — LOW (ref 4.2–5.8)
RBC # BLD: 4.06 M/UL — LOW (ref 4.2–5.8)
RBC # BLD: 4.07 M/UL — LOW (ref 4.2–5.8)
RBC # FLD: 11.5 % — SIGNIFICANT CHANGE UP (ref 10.3–14.5)
RBC # FLD: 11.6 % — SIGNIFICANT CHANGE UP (ref 10.3–14.5)
RBC CASTS # UR COMP ASSIST: SIGNIFICANT CHANGE UP /HPF (ref 0–2)
RETICS #: 76.2 K/UL — SIGNIFICANT CHANGE UP (ref 25–125)
RETICS/RBC NFR: 2 % — SIGNIFICANT CHANGE UP (ref 0.5–2.5)
RH IG SCN BLD-IMP: POSITIVE — SIGNIFICANT CHANGE UP
SAO2 % BLDV: 86 % — SIGNIFICANT CHANGE UP (ref 67–88)
SODIUM SERPL-SCNC: 140 MMOL/L — SIGNIFICANT CHANGE UP (ref 135–145)
SODIUM SERPL-SCNC: 140 MMOL/L — SIGNIFICANT CHANGE UP (ref 135–145)
SP GR SPEC: 1.01 — SIGNIFICANT CHANGE UP (ref 1.01–1.02)
SPECIMEN SOURCE: SIGNIFICANT CHANGE UP
THC UR QL: NEGATIVE — SIGNIFICANT CHANGE UP
UROBILINOGEN FLD QL: NEGATIVE — SIGNIFICANT CHANGE UP
WBC # BLD: 15.1 K/UL — HIGH (ref 3.8–10.5)
WBC # BLD: 16.5 K/UL — HIGH (ref 3.8–10.5)
WBC # FLD AUTO: 15.1 K/UL — HIGH (ref 3.8–10.5)
WBC # FLD AUTO: 16.5 K/UL — HIGH (ref 3.8–10.5)
WBC UR QL: SIGNIFICANT CHANGE UP /HPF (ref 0–5)

## 2017-10-11 PROCEDURE — 71010: CPT | Mod: 26

## 2017-10-11 PROCEDURE — 12345: CPT | Mod: GC,NC

## 2017-10-11 PROCEDURE — 99223 1ST HOSP IP/OBS HIGH 75: CPT | Mod: GC

## 2017-10-11 RX ORDER — SODIUM CHLORIDE 9 MG/ML
1000 INJECTION, SOLUTION INTRAVENOUS
Qty: 0 | Refills: 0 | Status: DISCONTINUED | OUTPATIENT
Start: 2017-10-11 | End: 2017-10-13

## 2017-10-11 RX ORDER — SODIUM CHLORIDE 9 MG/ML
1000 INJECTION, SOLUTION INTRAVENOUS ONCE
Qty: 0 | Refills: 0 | Status: COMPLETED | OUTPATIENT
Start: 2017-10-11 | End: 2017-10-11

## 2017-10-11 RX ORDER — SODIUM CHLORIDE 9 MG/ML
1000 INJECTION INTRAMUSCULAR; INTRAVENOUS; SUBCUTANEOUS ONCE
Qty: 0 | Refills: 0 | Status: COMPLETED | OUTPATIENT
Start: 2017-10-11 | End: 2017-10-11

## 2017-10-11 RX ORDER — POTASSIUM PHOSPHATE, MONOBASIC POTASSIUM PHOSPHATE, DIBASIC 236; 224 MG/ML; MG/ML
15 INJECTION, SOLUTION INTRAVENOUS ONCE
Qty: 0 | Refills: 0 | Status: COMPLETED | OUTPATIENT
Start: 2017-10-11 | End: 2017-10-11

## 2017-10-11 RX ORDER — NICOTINE POLACRILEX 2 MG
1 GUM BUCCAL DAILY
Qty: 0 | Refills: 0 | Status: DISCONTINUED | OUTPATIENT
Start: 2017-10-11 | End: 2017-10-13

## 2017-10-11 RX ORDER — CEFEPIME 1 G/1
2000 INJECTION, POWDER, FOR SOLUTION INTRAMUSCULAR; INTRAVENOUS EVERY 12 HOURS
Qty: 0 | Refills: 0 | Status: DISCONTINUED | OUTPATIENT
Start: 2017-10-11 | End: 2017-10-11

## 2017-10-11 RX ORDER — ACETAMINOPHEN 500 MG
650 TABLET ORAL EVERY 6 HOURS
Qty: 0 | Refills: 0 | Status: DISCONTINUED | OUTPATIENT
Start: 2017-10-11 | End: 2017-10-13

## 2017-10-11 RX ORDER — SODIUM CHLORIDE 9 MG/ML
150 INJECTION, SOLUTION INTRAVENOUS ONCE
Qty: 0 | Refills: 0 | Status: DISCONTINUED | OUTPATIENT
Start: 2017-10-11 | End: 2017-10-11

## 2017-10-11 RX ORDER — MEROPENEM 1 G/30ML
1000 INJECTION INTRAVENOUS ONCE
Qty: 0 | Refills: 0 | Status: COMPLETED | OUTPATIENT
Start: 2017-10-11 | End: 2017-10-11

## 2017-10-11 RX ORDER — CEFTRIAXONE 500 MG/1
1 INJECTION, POWDER, FOR SOLUTION INTRAMUSCULAR; INTRAVENOUS EVERY 24 HOURS
Qty: 0 | Refills: 0 | Status: DISCONTINUED | OUTPATIENT
Start: 2017-10-11 | End: 2017-10-13

## 2017-10-11 RX ADMIN — SODIUM CHLORIDE 1000 MILLILITER(S): 9 INJECTION, SOLUTION INTRAVENOUS at 01:30

## 2017-10-11 RX ADMIN — CEFEPIME 100 MILLIGRAM(S): 1 INJECTION, POWDER, FOR SOLUTION INTRAMUSCULAR; INTRAVENOUS at 02:08

## 2017-10-11 RX ADMIN — MEROPENEM 200 MILLIGRAM(S): 1 INJECTION INTRAVENOUS at 04:30

## 2017-10-11 RX ADMIN — Medication 1 PATCH: at 13:04

## 2017-10-11 RX ADMIN — CEFTRIAXONE 100 GRAM(S): 500 INJECTION, POWDER, FOR SOLUTION INTRAMUSCULAR; INTRAVENOUS at 22:00

## 2017-10-11 RX ADMIN — Medication 400 MILLIGRAM(S): at 04:27

## 2017-10-11 RX ADMIN — POTASSIUM PHOSPHATE, MONOBASIC POTASSIUM PHOSPHATE, DIBASIC 62.5 MILLIMOLE(S): 236; 224 INJECTION, SOLUTION INTRAVENOUS at 19:00

## 2017-10-11 RX ADMIN — SODIUM CHLORIDE 1000 MILLILITER(S): 9 INJECTION INTRAMUSCULAR; INTRAVENOUS; SUBCUTANEOUS at 00:17

## 2017-10-11 RX ADMIN — SODIUM CHLORIDE 150 MILLILITER(S): 9 INJECTION, SOLUTION INTRAVENOUS at 00:42

## 2017-10-11 RX ADMIN — SODIUM CHLORIDE 1000 MILLILITER(S): 9 INJECTION INTRAMUSCULAR; INTRAVENOUS; SUBCUTANEOUS at 18:21

## 2017-10-11 NOTE — ED CDU PROVIDER NOTE - OBJECTIVE STATEMENT
36yo M with no PMH presenting to ED with dysuria/hematuria and subjective fever/chills and body aches x 2 days. Pt seen in [facility] ED yesterday with same complaints, given "2 pills and a shot" and d/c home to f/u with hematology 2/2 abnl platelet morphology. Per EMR, unremarkable CT a/p performed yesterday. Pt report persistent symptoms, feeling worse today. Also reports several episodes of non-bloody diarrhea and vomiting since last night. No h/o kidney stones. Denies h/o STDs, testicular pain, penile discharge/lesions.   [facility] patient

## 2017-10-11 NOTE — H&P ADULT - NSHPPHYSICALEXAM_GEN_ALL_CORE
Vital Signs Last 24 Hrs  T(C): 38.5 (10-11-17 @ 04:37)  T(F): 101.3 (10-11-17 @ 04:37), Max: 102.4 (10-11-17 @ 04:13)  HR: 99 (10-11-17 @ 05:00) (94 - 124)  BP: 99/56 (10-11-17 @ 05:00)  RR: 18 (10-11-17 @ 04:37) (16 - 19)  SpO2: 100% (10-11-17 @ 04:37) (97% - 100%)    Physical Exam   Appearance: Young appearing man in NAD.  HEENT:   Normal oral mucosa, PERRL, EOMI. MMM	  Lymphatic: No lymphadenopathy in anterior and posterior cervical chain.   Cardiovascular: Normal S1 S2, No JVD, No murmurs, No edema.  Respiratory: Lungs clear to auscultation	  Psychiatry: A & O x 3, Mood & affect appropriate  Gastrointestinal:  Soft, suprapubic tenderness. Non-distended. + BS  Back: mild CVA right sided tenderness	  Skin: No rashes, No ecchymoses, No cyanosis	  Neurologic: Non-focal  Extremities: Normal range of motion, No clubbing, cyanosis or edema  Vascular: Peripheral pulses palpable 2+ bilaterally

## 2017-10-11 NOTE — H&P ADULT - NSHPREVIEWOFSYSTEMS_GEN_ALL_CORE
CONSTITUTIONAL:  See above   HEENT:  Eyes:  No visual loss, blurred vision, double vision or yellow sclerae. Ears, Nose, Throat:  No hearing loss, sneezing, congestion, runny nose or sore throat.  SKIN:  No rash or itching.  CARDIOVASCULAR:  No chest pain, chest pressure or chest discomfort. No palpitations or edema.  RESPIRATORY:  No shortness of breath, cough or sputum.  GASTROINTESTINAL:  See above   GENITOURINARY:  See above   NEUROLOGICAL:  No headache, dizziness, syncope, paralysis, ataxia, numbness or tingling in the extremities. No change in bowel or bladder control.  MUSCULOSKELETAL:  back pain   HEMATOLOGIC:  No anemia, bleeding or bruising.  LYMPHATICS:  No enlarged nodes. No history of splenectomy.  PSYCHIATRIC:  No history of depression or anxiety.  ALLERGIES:  No history of asthma, hives, eczema or rhinitis.

## 2017-10-11 NOTE — PROGRESS NOTE ADULT - PROBLEM SELECTOR PLAN 3
Likely multifactorial in setting decreased PO intake, pyelonephritis.   Improved from 1.16 to 0.91. s/p 7L IVF(NS and LR combined). Currently on LR 150cc/h. No signs of volume overload.  Will remove Oh as soon as hemodynamics improve.

## 2017-10-11 NOTE — H&P ADULT - PROBLEM SELECTOR PLAN 2
Plan as above  Continue treatement with Cefepime 2g q8h.   Follow up UCx and BCx. Plan as above  Continue treatement with Ceftriaxone 1gQD.  Follow up UCx and BCx.

## 2017-10-11 NOTE — H&P ADULT - HISTORY OF PRESENT ILLNESS
37 year old man ?kidney stones with abdominal sx presents to the ED with dysuria x 2days. Of note, patient seen in ED  10/9 with similar complaints of dysuria, hematuria and penile discharged 4 hours prior to coming in. Patient was treated with 1 dose Ceftriaxone and Azithromycin for concern for STD and discharged with outpatient follow up for platelet clumping on CBC. Patient that he had no improvement in symptoms when he returned home. States that he had worsening dysuria associated with urinary hesitancy, suprapubic tenderness and right sided pulsating intermittent back pain. Reports fevers of 102.5 at home associated with chills, decreased PO intake and 1 episodes of NBNB emesis. Denies diarrhea or constipation. No previous hospitalizations. No previous h/o ESBL UTI.     ED course was complicated by hypotension 80s/50s. MICU evaluated and deemed not a MICU candidate. BP improved  100/60s.   In the Ed patient received Tylenol IV x1 dose, Ketorolac 15g x2, Ceftriaxone x 1 dose, Meropenem x 1 dose, Cefepime x 1 dose, Zofran x 1 dose, 4L NS bolus and 3L LR bolus.   Patient s/p Oh in CDU.     Of note, patient recently treated for right buttock lesion with Bactrim x 7 days.

## 2017-10-11 NOTE — CONSULT NOTE ADULT - ATTENDING COMMENTS
38 yo [place] male with hx of MRSA cellulitis of R knee ( now resolved) originally presented on 10/9 for hematuria and dysuria, pt presumptively treated for CG/chlamydia and discharged home. Pt returned last night with same symptoms including fevers and hypotension, pt's original UA overlooked, was (+) with >50 WBC, large LE. Pt admitted to CDU for urosepsis.  Patient now being evaluated by MICU for -  1. Urosepsis with hematuria-  Ucx with >10^5 Ecoli- CT-A/P negative for pyelonephritis or acute obstructive process, would panculture and if patient continues to be hypotensive with rigors would give ESBL coverage pending sensitivities then de-escalate.  2. Hypotension- distributive coupled with hypovolemia- IVF, monitor UOP and lactate. Keep MAP >65  3. Mild NADER- blum, strict I/O's patient c/o urethritis like sx- though extremely poor historian, keep fluid balance equal to positive.  4. Anxiety- unclear etiology, toxicology including, Alc level, plus would place nicotine patch   Care and treatment as detailed above unless otherwise stated . case discussed extensively with patient and wife, staff, team and specialist on board.  No need for MICU at this juncture. Please feel free to re-consult if any change in patient's clinical status. 36 yo [place]yanese male with hx of MRSA cellulitis of R knee ( now resolved) originally presented on 10/9 for hematuria and dysuria, pt presumptively treated for CG/chlamydia and discharged home. Pt returned last night with same symptoms including fevers and hypotension, pt's original UA overlooked, was (+) with >50 WBC, large LE. Pt admitted to CDU for urosepsis.  Patient now being evaluated by MICU for -  1. Urosepsis with hematuria-  Ucx with >10^5 Ecoli- CT-A/P negative for pyelonephritis or acute obstructive process, would panculture and if patient continues to be hypotensive with rigors would give ESBL coverage pending sensitivities then de-escalate.  2. Hypotension- distributive coupled with hypovolemia- IVF, monitor UOP and lactate. Keep MAP >65  3. Mild NADER- blum, strict I/O's patient c/o urethritis like sx- though extremely poor historian, keep fluid balance equal to positive.  4. Anxiety- unclear etiology, toxicology including, Alc level, plus would place nicotine patch   5.thrombocytopenia -chronic- unclear etiology, T&S, monitor especially patient c/o hematuria- likely septic component   Care and treatment as detailed above unless otherwise stated . case discussed extensively with patient and wife, staff, team and specialist on board.  No need for MICU at this juncture. Please feel free to re-consult if any change in patient's clinical status.

## 2017-10-11 NOTE — H&P ADULT - PROBLEM SELECTOR PLAN 3
Likely multifactorial in setting decreased PO intake, pyelonephritis.   Improved from 1.16 to 0.91. s/p 7L IVF(NS and LR combined). Currently on LR 150cc/h. No signs of volume overload.   Will monitor off fluids for now. Strict I/Os . Avoid nephrotoxins. Will d/c Toradol as nephrotoxic. Likely multifactorial in setting decreased PO intake, pyelonephritis.   Improved from 1.16 to 0.91. s/p 7L IVF(NS and LR combined). Currently on LR 150cc/h. No signs of volume overload.  Will remove Oh as soon as hemodynamics improve.

## 2017-10-11 NOTE — H&P ADULT - PROBLEM SELECTOR PLAN 1
Patient p/w dysuria, hematuria, suprapubic tenderness and right sided CVA tenderness in setting of fever T102.4, leukocytosis, tachycardia + UA. Admitted with sepsis likely 2/2 pyelonephritis.  UCx from 10/9 E.Coli pending sensitivities.   s/p 7L IVF, 1 dose Ceftriaxone, 1 dose Meropenem and 1 dose Cefepime.    Treat with  Cefepime 2g q8h Ucx sensitives and de-escalate.   Follow up r/p UCx 10/10. Follow up sensitivities of UCx 10/9. Follow up BCx 10/10.  Tylenol 650mg q6h PRN fever. Tylenol 650mg q6h PRN  mild pain.  Remove Oh as nidus of infection. Patient p/w dysuria, hematuria, suprapubic tenderness and right sided CVA tenderness in setting of fever T102.4, leukocytosis, tachycardia + UA. Admitted with sepsis likely 2/2 pyelonephritis.  UCx from 10/9 E.Coli pending sensitivities.   s/p 7L IVF, 1 dose Ceftriaxone, 1 dose Meropenem and 1 dose Cefepime.    Treat with  Ceftriaxone 1g QD Ucx sensitives and de-escalate. Continue LR 1500c/h.   Follow up r/p UCx 10/10. Follow up sensitivities of UCx 10/9. Follow up BCx 10/10.  Tylenol 650mg q6h PRN fever. Tylenol 650mg q6h PRN  mild pain.  Continue Oh pending improvement in hemodynamics.

## 2017-10-11 NOTE — ED CDU PROVIDER NOTE - MEDICAL DECISION MAKING DETAILS
Attending Cuellar: 38 y/o male h/o UTI in the past presenting with fever and dysuria. upon arrival to the ed pt febrile with suprapubic discomfort. pt also with vomiting and diarrhea. abd soft on exam. pt had ct abd/pel yesterday which was negative for renal stone making septic stone unlikely. suspect pyelonephritis. plan for continued IVF and abx. no testicular pain or evidence of demetris. pt states has had uti in the past.  no h/o ureteral reflux. not h/o immunosuppression

## 2017-10-11 NOTE — H&P ADULT - NSHPLABSRESULTS_GEN_ALL_CORE
Labs personally reviewed by me.   12.6   15.1  )-----------( CLUMPED    ( 11 Oct 2017 01:04 )             35.9     Auto Eosinophil # 0.0   / Auto Eosinophil % 0.1   / Auto Neutrophil # 13.2  / Auto Neutrophil % 87.4  / BANDS % x                            14.9   19.5  )-----------( CLUMPED    ( 10 Oct 2017 21:50 )             43.3     Auto Eosinophil # 0.0   / Auto Eosinophil % 0.2   / Auto Neutrophil # 16.1  / Auto Neutrophil % 82.7  / BANDS % x                            x      x     )-----------( CLUMPED    ( 09 Oct 2017 19:02 )             x        Auto Eosinophil # x     / Auto Eosinophil % x     / Auto Neutrophil # x     / Auto Neutrophil % x     / BANDS % x    10    140  |  110<H>  |  10  ----------------------------<  108<H>  3.8   |  20<L>  |  0.91  10-10    138  |  100  |  12  ----------------------------<  110<H>  4.2   |  24  |  1.16    Ca    7.5<L>      11 Oct 2017 01:04  Ca    9.4      10 Oct 2017 21:50    TPro  5.9<L>  /  Alb  3.2<L>  /  TBili  0.6  /  DBili  x   /  AST  17  /  ALT  18  /  AlkPhos  64  10-11  TPro  8.0  /  Alb  4.3  /  TBili  0.8  /  DBili  x   /  AST  25  /  ALT  24  /  AlkPhos  85  10-10    10-11-17 @ 01:04 - VBG - pH: 7.33  | pCO2: 43    | pO2: 54    | Lactate: 0.7    10-10-17 @ 21:50 - VBG - pH: 7.38  | pCO2: 47    | pO2: 22    | Lactate: 1.5      Urinalysis Basic - ( 11 Oct 2017 01:04 )    Color: Yellow / Appearance: Clear / S.013 / pH: x  Gluc: x / Ketone: Trace  / Bili: Negative / Urobili: Negative   Blood: x / Protein: Trace / Nitrite: Negative   Leuk Esterase: Moderate / RBC: 3-5 /HPF / WBC 11-25 /HPF   Sq Epi: x / Non Sq Epi: OCC /HPF / Bacteria: Few /HPF    UCx 10/9 >100K Ecoli     RVP negative. Utox negative. Syphillis negative. GC/Chlamydia negative.     CT Abdomen and Pelvis No Cont (10.09.17 @ 18:26)    No nephroureterolithiasis or hydroureteronephrosis.  The etiology of abdominal pain is not elucidated on this exam.  Small radiodensity incompletely visualized located right lower lobe medially. Please correlate with patient's history.    CXR personally reviewed by me: Clear lungs Labs personally reviewed by me.   12.6   15.1  )-----------( CLUMPED    ( 11 Oct 2017 01:04 )             35.9     Auto Eosinophil # 0.0   / Auto Eosinophil % 0.1   / Auto Neutrophil # 13.2  / Auto Neutrophil % 87.4  / BANDS % x                            14.9   19.5  )-----------( CLUMPED    ( 10 Oct 2017 21:50 )             43.3     Auto Eosinophil # 0.0   / Auto Eosinophil % 0.2   / Auto Neutrophil # 16.1  / Auto Neutrophil % 82.7  / BANDS % x                            x      x     )-----------( CLUMPED    ( 09 Oct 2017 19:02 )             x        Auto Eosinophil # x     / Auto Eosinophil % x     / Auto Neutrophil # x     / Auto Neutrophil % x     / BANDS % x    10    140  |  110<H>  |  10  ----------------------------<  108<H>  3.8   |  20<L>  |  0.91  10-10    138  |  100  |  12  ----------------------------<  110<H>  4.2   |  24  |  1.16    Ca    7.5<L>      11 Oct 2017 01:04  Ca    9.4      10 Oct 2017 21:50    TPro  5.9<L>  /  Alb  3.2<L>  /  TBili  0.6  /  DBili  x   /  AST  17  /  ALT  18  /  AlkPhos  64  10-11  TPro  8.0  /  Alb  4.3  /  TBili  0.8  /  DBili  x   /  AST  25  /  ALT  24  /  AlkPhos  85  10-10    10-11-17 @ 01:04 - VBG - pH: 7.33  | pCO2: 43    | pO2: 54    | Lactate: 0.7    10-10-17 @ 21:50 - VBG - pH: 7.38  | pCO2: 47    | pO2: 22    | Lactate: 1.5      Urinalysis Basic - ( 11 Oct 2017 01:04 )    Color: Yellow / Appearance: Clear / S.013 / pH: x  Gluc: x / Ketone: Trace  / Bili: Negative / Urobili: Negative   Blood: x / Protein: Trace / Nitrite: Negative   Leuk Esterase: Moderate / RBC: 3-5 /HPF / WBC 11-25 /HPF   Sq Epi: x / Non Sq Epi: OCC /HPF / Bacteria: Few /HPF    UCx 10/9 >100K Ecoli     RVP negative. Utox negative. Syphillis negative. GC/Chlamydia negative.     CT Abdomen and Pelvis No Cont (10.09.17 @ 18:26)    No nephroureterolithiasis or hydroureteronephrosis.  The etiology of abdominal pain is not elucidated on this exam.  Small radiodensity incompletely visualized located right lower lobe medially. Please correlate with patient's history.    CXR personally reviewed by me: Clear lungs  EKG Sinus tachycardia 118.

## 2017-10-11 NOTE — CONSULT NOTE ADULT - PROBLEM SELECTOR RECOMMENDATION 9
- pt has room for fluid resuscitation, please bolus an additional ~2L LR and then continue with maintenance fluid 125-150 cc/hr.  - Ucx pending sensitivities, c/w rocephin for now  - f/u BCx  - blum insertion for strict I/Os given sepsis   - if pressures still soft after fluids, can transiently start midodrine 10 mg TID  - c/w tylenol/cooling blanket - pt has room for fluid resuscitation, please bolus an additional ~2L LR and then continue with maintenance fluid 125-150 cc/hr.  - Ucx pending sensitivities, c/w  abx for now with low threshold for starting ESBL coverage if persistent hypotension and rigors.  - f/u BCx  - blum insertion for strict I/Os given sepsis   - if pressures still soft after fluids, can transiently start midodrine 10 mg TID  - c/w tylenol/ cooling blanket

## 2017-10-11 NOTE — ED ADULT NURSE REASSESSMENT NOTE - NS ED NURSE REASSESS COMMENT FT1
Pt hypotensive despite multiple boluses. As per ALEXANDRA Avila 2nd IV needed for more hydration. Pt also states he feels dizzy. Safety maintained will continue to monitor.
Pt now stable after 6L. Pt normotensive but tachycardic. Pt placed on telemonitoring & urethral catheter placed. Q30 BP initiated. Safety & comfort measures maintained. Will continue to monitor.
Pt states he is feeling much better. Abx given as ordered. Pt febrile as per flowsheet. Pt admitted for continued care. Stable for transport-
Pt received from SOLEDAD Alvarez. Pt oriented to CDU & plan of care was discussed. Pt complains of generalized body aches, headache & urinary symptoms. Medicated as per MAR. Safety & comfort measures maintained. Call bell in reach. Will continue to monitor.

## 2017-10-11 NOTE — PROGRESS NOTE ADULT - PROBLEM SELECTOR PLAN 1
Patient p/w dysuria, hematuria, suprapubic tenderness and right sided CVA tenderness in setting of fever T102.4, leukocytosis, tachycardia + UA. Admitted with sepsis likely 2/2 pyelonephritis.  UCx from 10/9 E.Coli pending sensitivities.   s/p 7L IVF, 1 dose Ceftriaxone, 1 dose Meropenem and 1 dose Cefepime.    Treat with  Ceftriaxone 1g QD Ucx sensitives and de-escalate. Continue LR 1500c/h.   Follow up r/p UCx 10/10. Follow up sensitivities of UCx 10/9. Follow up BCx 10/10.  Tylenol 650mg q6h PRN fever. Tylenol 650mg q6h PRN  mild pain.  Continue Oh pending improvement in hemodynamics.

## 2017-10-11 NOTE — H&P ADULT - NSHPSOCIALHISTORY_GEN_ALL_CORE
Lives with wife. Sexually active with partner. Denies h/o STIs.  .  Orginally from [facility].  0.33packs x 15 years   Social drinker.   Denies recreational drug use.

## 2017-10-11 NOTE — CONSULT NOTE ADULT - SUBJECTIVE AND OBJECTIVE BOX
CHIEF COMPLAINT: hematuria and rigors    HPI:  38 yo [facility] male with hx of MRSA cellulitis of R knee ( now resolved) originally presented on 10/9 for hematuria and dysuria, pt presumptively treated for CG/chlamydia and discharged home. Pt returned last night with same symptoms including fevers and hypotension, pt's original UA overlooked, was (+) with >50 WBC, large LE. Pt admitted to CDU for urosepsis.    Now, pt s/p 4L NS and hypotensive to 80s/50s, given Rocephin for UTI and tylenol/toradol. MICU consulted for hypotension as per [facility].    PAST MEDICAL & SURGICAL HISTORY:  MRSA of R knee, resolved    No significant past surgical history      FAMILY HISTORY:  No pertinent family history in first degree relatives      SOCIAL HISTORY:  Smoking: .33 packs x 15 years  EtOH Use: social  Marital Status:   Occupation:   Recent Travel: no  Country of Birth: [facility]  Advance Directives: full code    Allergies    No Known Allergies    Intolerances        HOME MEDICATIONS: none    Constitutional: denies fevers, chills, night sweats, weight loss  HEENT: denies visual changes, hearing changes, rhinitis, odynophagia, or dysphagia  Cardiovascular: denies palpitations, chest pain, edema  Respiratory: denies SOB, wheezing  Gastrointestinal: + nausea  : + dysuria, hematuria  MSK: denies weakness, joint pain  Skin: denies new rashes or masses    OBJECTIVE:  ICU Vital Signs Last 24 Hrs  T(C): 37.8 (10 Oct 2017 23:02), Max: 38.4 (10 Oct 2017 20:44)  T(F): 100 (10 Oct 2017 23:02), Max: 101.2 (10 Oct 2017 20:44)  HR: 94 (10 Oct 2017 23:02) (94 - 124)  BP: 88/57 (11 Oct 2017 00:00) (87/59 - 130/40)  BP(mean): --  ABP: --  ABP(mean): --  RR: 18 (10 Oct 2017 23:02) (16 - 19)  SpO2: 97% (10 Oct 2017 23:02) (97% - 100%)        CAPILLARY BLOOD GLUCOSE    PHYSICAL EXAM:  GENERAL: NAD, well-developed  HEAD: Atraumatic, Normocephalic  EYES: EOMI, PERRLA, conjunctiva and sclera clear  NECK: Supple, No JVD  CHEST/LUNG: Clear to auscultation bilaterally; No wheezes/rales/rhonchi  HEART: tachycardic  ABDOMEN: Soft, Nontender, Nondistended; Bowel sounds present. NO CVA tenderness  EXTREMITIES:  2+ dP pulses b/l, No clubbing, cyanosis, or edema  PSYCH: reactive affect  NEUROLOGY: AAOx3, non-focal  SKIN: No rashes or lesions      HOSPITAL MEDICATIONS:  MEDICATIONS  (STANDING):  cefTRIAXone   IVPB 1 Gram(s) IV Intermittent every 12 hours  lactated ringers Bolus 1000 milliLiter(s) IV Bolus once  lactated ringers Bolus 1000 milliLiter(s) IV Bolus once  lactated ringers. 1000 milliLiter(s) (150 mL/Hr) IV Continuous <Continuous>    MEDICATIONS  (PRN):  acetaminophen  IVPB 1000 milliGRAM(s) IV Intermittent every 6 hours PRN For Temp greater than 38 C (100.4 F)  ketorolac   Injectable 30 milliGRAM(s) IV Push every 8 hours PRN Moderate Pain (4 - 6)      LABS:                        12.6   15.1  )-----------( x        ( 11 Oct 2017 01:04 )             35.9     10-11    140  |  110<H>  |  10  ----------------------------<  108<H>  3.8   |  20<L>  |  0.91    Ca    7.5<L>      11 Oct 2017 01:04    TPro  5.9<L>  /  Alb  3.2<L>  /  TBili  0.6  /  DBili  x   /  AST  17  /  ALT  18  /  AlkPhos  64  10-11      Urinalysis Basic - ( 11 Oct 2017 01:04 )    Color: Yellow / Appearance: Clear / S.013 / pH: x  Gluc: x / Ketone: Trace  / Bili: Negative / Urobili: Negative   Blood: x / Protein: Trace / Nitrite: Negative   Leuk Esterase: Moderate / RBC: 3-5 /HPF / WBC 11-25 /HPF   Sq Epi: x / Non Sq Epi: OCC /HPF / Bacteria: Few /HPF        Venous Blood Gas:  10-11 @ 01:04  7.33/43/54/22/86  VBG Lactate: 0.7  Venous Blood Gas:  10-10 @ 21:50  7.38/47/22/27/  VBG Lactate: 1.5  Venous Blood Gas:  10-09 @ 17:14  7.35/54//  VBG Lactate: 1.5      MICROBIOLOGY: UCx: E.coli >100K

## 2017-10-11 NOTE — PROGRESS NOTE ADULT - SUBJECTIVE AND OBJECTIVE BOX
Patient is a 37y old  Male who presents with a chief complaint of dysuria (11 Oct 2017 05:25)      SUBJECTIVE / OVERNIGHT EVENTS:    Pt is 38 yo M PMH of nephrolithiasis? came to ED with dysuria, STD got cftx and azithro -> home T max 102.5, NBNB emesis, being treated for pyelo, in CDU, became hypotensive to 80s, got 7 L NS, MICU rejected, systolic BP in teens.    HPI:  37 year old man ?kidney stones with abdominal sx presents to the ED with dysuria x 2days. Of note, patient seen in ED  10/9 with similar complaints of dysuria, hematuria and penile discharged 4 hours prior to coming in. Patient was treated with 1 dose Ceftriaxone and Azithromycin for concern for STD and discharged with outpatient follow up for platelet clumping on CBC. Patient that he had no improvement in symptoms when he returned home. States that he had worsening dysuria associated with urinary hesitancy, suprapubic tenderness and right sided pulsating intermittent back pain. Reports fevers of 102.5 at home associated with chills, decreased PO intake and 1 episodes of NBNB emesis. Denies diarrhea or constipation. No previous hospitalizations. No previous h/o ESBL UTI.     ED course was complicated by hypotension 80s/50s. MICU evaluated and deemed not a MICU candidate. BP improved  100/60s.   In the Ed patient received Tylenol IV x1 dose, Ketorolac 15g x2, Ceftriaxone x 1 dose, Meropenem x 1 dose, Cefepime x 1 dose, Zofran x 1 dose, 4L NS bolus and 3L LR bolus.   Patient s/p Oh in CDU.     Of note, patient recently treated for right buttock lesion with Bactrim x 7 days. (11 Oct 2017 05:25)      MEDICATIONS  (STANDING):  cefTRIAXone   IVPB 1 Gram(s) IV Intermittent every 24 hours  lactated ringers. 1000 milliLiter(s) (150 mL/Hr) IV Continuous <Continuous>  nicotine -   7 mG/24Hr(s) Patch 1 patch Transdermal daily    MEDICATIONS  (PRN):  acetaminophen   Tablet 650 milliGRAM(s) Oral every 6 hours PRN For Temp greater than 38 C (100.4 F)  acetaminophen   Tablet. 650 milliGRAM(s) Oral every 6 hours PRN Mild Pain (1 - 3)      Vital Signs Last 24 Hrs  T(C): 38.5 (11 Oct 2017 04:37), Max: 39.1 (11 Oct 2017 04:13)  T(F): 101.3 (11 Oct 2017 04:37), Max: 102.4 (11 Oct 2017 04:13)  HR: 99 (11 Oct 2017 05:00) (94 - 124)  BP: 99/56 (11 Oct 2017 05:00) (80/59 - 130/40)  BP(mean): --  RR: 18 (11 Oct 2017 04:37) (16 - 19)  SpO2: 100% (11 Oct 2017 04:37) (97% - 100%)  CAPILLARY BLOOD GLUCOSE        I&O's Summary      PHYSICAL EXAM:  GENERAL: NAD, well-developed  HEAD:  Atraumatic, Normocephalic  EYES: EOMI, PERRLA, conjunctiva and sclera clear  NECK: Supple, No JVD  CHEST/LUNG: Clear to auscultation bilaterally; No wheeze  HEART: Regular rate and rhythm; No murmurs, rubs, or gallops  ABDOMEN: Soft, Nontender, Nondistended; Bowel sounds present  EXTREMITIES:  2+ Peripheral Pulses, No clubbing, cyanosis, or edema  PSYCH: AAOx3  NEUROLOGY: non-focal  SKIN: No rashes or lesions    LABS:                        12.6   15.1  )-----------( CLUMPED    ( 11 Oct 2017 01:04 )             35.9     10-11    140  |  108  |  10  ----------------------------<  104<H>  3.7   |  21<L>  |  0.94    Ca    7.9<L>      11 Oct 2017 06:51  Phos  1.4     10-11  Mg     1.6     10-11    TPro  5.9<L>  /  Alb  3.1<L>  /  TBili  1.5<H>  /  DBili  x   /  AST  22  /  ALT  22  /  AlkPhos  87  10-11    PT/INR - ( 11 Oct 2017 06:52 )   PT: 13.7 sec;   INR: 1.25 ratio         PTT - ( 11 Oct 2017 06:52 )  PTT:28.1 sec  CARDIAC MARKERS ( 11 Oct 2017 01:04 )  x     / x     / 236 U/L / x     / x          Urinalysis Basic - ( 11 Oct 2017 01:04 )    Color: Yellow / Appearance: Clear / S.013 / pH: x  Gluc: x / Ketone: Trace  / Bili: Negative / Urobili: Negative   Blood: x / Protein: Trace / Nitrite: Negative   Leuk Esterase: Moderate / RBC: 3-5 /HPF / WBC 11-25 /HPF   Sq Epi: x / Non Sq Epi: OCC /HPF / Bacteria: Few /HPF        RADIOLOGY & ADDITIONAL TESTS:    Imaging Personally Reviewed:    Consultant(s) Notes Reviewed:      Care Discussed with Consultants/Other Providers: Patient is a 37y old  Male who presents with a chief complaint of dysuria (11 Oct 2017 05:25)      SUBJECTIVE / OVERNIGHT EVENTS:    Pt is 38 yo M PMH of nephrolithiasis? came to ED with dysuria, STD got cftx and azithro -> home T max 102.5, NBNB emesis, being treated for pyelo, in CDU, became hypotensive to 80s, got 7 L NS, MICU rejected, systolic BP in teens.    The patient is a 38 yo man with questionable episode of nephrolithiasis in  p/w fever, N/V, right flank pain, dysuria, and hematuria. Pt states symptoms started 3 days ago, came to [facility] ED, was given ceftriaxone and azithromycin for presumed STD and sent home. He stated in the interval pain had gotten worse radiating to this testicles and lower extremities. He had 1 episode of NBNB emesis and hematuria prompting him to return to the ED. Cites no modifying or palliative features. Endorses fever at home Tmax 102.5, chills. Denies headaches, diarrhea, no changes in skin, no hospitalizations no urologic history.    In ED pt received 7 L fluids (4 NS, 3 LR) with hypotensive event systolic in 80's. Received Tylenol IV x1 dose, Ketorolac 15g x2, Ceftriaxone x 1 dose, Meropenem x 1 dose, Cefepime x 1 dose, Zofran x 1 dose. Ucx and BCx taken.     HPI:  37 year old man ?kidney stones with abdominal sx presents to the ED with dysuria x 2days. Of note, patient seen in ED  10/9 with similar complaints of dysuria, hematuria and penile discharged 4 hours prior to coming in. Patient was treated with 1 dose Ceftriaxone and Azithromycin for concern for STD and discharged with outpatient follow up for platelet clumping on CBC. Patient that he had no improvement in symptoms when he returned home. States that he had worsening dysuria associated with urinary hesitancy, suprapubic tenderness and right sided pulsating intermittent back pain. Reports fevers of 102.5 at home associated with chills, decreased PO intake and 1 episodes of NBNB emesis. Denies diarrhea or constipation. No previous hospitalizations. No previous h/o ESBL UTI.     ED course was complicated by hypotension 80s/50s. MICU evaluated and deemed not a MICU candidate. BP improved  100/60s.   In the Ed patient received Tylenol IV x1 dose, Ketorolac 15g x2, Ceftriaxone x 1 dose, Meropenem x 1 dose, Cefepime x 1 dose, Zofran x 1 dose, 4L NS bolus and 3L LR bolus.   Patient s/p Oh in CDU.     Of note, patient recently treated for right buttock lesion with Bactrim x 7 days. (11 Oct 2017 05:25)      MEDICATIONS  (STANDING):  cefTRIAXone   IVPB 1 Gram(s) IV Intermittent every 24 hours  lactated ringers. 1000 milliLiter(s) (150 mL/Hr) IV Continuous <Continuous>  nicotine -   7 mG/24Hr(s) Patch 1 patch Transdermal daily    MEDICATIONS  (PRN):  acetaminophen   Tablet 650 milliGRAM(s) Oral every 6 hours PRN For Temp greater than 38 C (100.4 F)  acetaminophen   Tablet. 650 milliGRAM(s) Oral every 6 hours PRN Mild Pain (1 - 3)      Vital Signs Last 24 Hrs  T(C): 38.5 (11 Oct 2017 04:37), Max: 39.1 (11 Oct 2017 04:13)  T(F): 101.3 (11 Oct 2017 04:37), Max: 102.4 (11 Oct 2017 04:13)  HR: 99 (11 Oct 2017 05:00) (94 - 124)  BP: 99/56 (11 Oct 2017 05:00) (80/59 - 130/40)  BP(mean): --  RR: 18 (11 Oct 2017 04:37) (16 - 19)  SpO2: 100% (11 Oct 2017 04:37) (97% - 100%)  CAPILLARY BLOOD GLUCOSE        I&O's Summary      PHYSICAL EXAM:  GENERAL: NAD, well-developed  HEAD:  Atraumatic, Normocephalic  EYES: EOMI, PERRLA, conjunctiva and sclera clear  NECK: Supple, No JVD  CHEST/LUNG: Clear to auscultation bilaterally; No wheeze  HEART: Regular rate and rhythm; No murmurs, rubs, or gallops  ABDOMEN: Soft, Nontender, Nondistended; Bowel sounds present, POSTIVE R costovertebral tenderness  EXTREMITIES:  2+ Peripheral Pulses, No clubbing, cyanosis, or edema  PSYCH: AAOx3  NEUROLOGY: non-focal  SKIN: No rashes or lesions    LABS:                        12.6   15.1  )-----------( CLUMPED    ( 11 Oct 2017 01:04 )             35.9     10-11    140  |  108  |  10  ----------------------------<  104<H>  3.7   |  21<L>  |  0.94    Ca    7.9<L>      11 Oct 2017 06:51  Phos  1.4     10-11  Mg     1.6     10-11    TPro  5.9<L>  /  Alb  3.1<L>  /  TBili  1.5<H>  /  DBili  x   /  AST  22  /  ALT  22  /  AlkPhos  87  10-11    PT/INR - ( 11 Oct 2017 06:52 )   PT: 13.7 sec;   INR: 1.25 ratio         PTT - ( 11 Oct 2017 06:52 )  PTT:28.1 sec  CARDIAC MARKERS ( 11 Oct 2017 01:04 )  x     / x     / 236 U/L / x     / x          Urinalysis Basic - ( 11 Oct 2017 01:04 )    Color: Yellow / Appearance: Clear / S.013 / pH: x  Gluc: x / Ketone: Trace  / Bili: Negative / Urobili: Negative   Blood: x / Protein: Trace / Nitrite: Negative   Leuk Esterase: Moderate / RBC: 3-5 /HPF / WBC 11-25 /HPF   Sq Epi: x / Non Sq Epi: OCC /HPF / Bacteria: Few /HPF        RADIOLOGY & ADDITIONAL TESTS:    Imaging Personally Reviewed:    Consultant(s) Notes Reviewed:      Care Discussed with Consultants/Other Providers: Patient is a 37y old  Male who presents with a chief complaint of dysuria (11 Oct 2017 05:25)      SUBJECTIVE / OVERNIGHT EVENTS:    Pt is 38 yo M PMH of nephrolithiasis? came to ED with dysuria, STD got cftx and azithro -> home T max 102.5, NBNB emesis, being treated for pyelo, in CDU, became hypotensive to 80s, got 7 L NS, MICU rejected, systolic BP in one teens.    The patient is a 38 yo man with questionable episode of nephrolithiasis in  p/w fever, N/V, right flank pain, dysuria, and hematuria. Pt states symptoms started 3 days ago, came to [facility] ED, was given ceftriaxone and azithromycin for presumed STD and sent home. He stated in the interval pain had gotten worse radiating to this testicles and lower extremities. He had 1 episode of NBNB emesis and hematuria prompting him to return to the ED. Cites no modifying or palliative features. Endorses fever at home Tmax 102.5, chills. Denies headaches, diarrhea, no changes in skin, no hospitalizations no urologic history.    In ED pt received 7 L fluids (4 NS, 3 LR) with hypotensive event systolic in 80's. Received Tylenol IV x1 dose, Ketorolac 15g x2, Ceftriaxone x 1 dose, Meropenem x 1 dose, Cefepime x 1 dose, Zofran x 1 dose. Ucx and BCx taken.     HPI:  37 year old man ?kidney stones with abdominal sx presents to the ED with dysuria x 2days. Of note, patient seen in ED  10/9 with similar complaints of dysuria, hematuria and penile discharged 4 hours prior to coming in. Patient was treated with 1 dose Ceftriaxone and Azithromycin for concern for STD and discharged with outpatient follow up for platelet clumping on CBC. Patient that he had no improvement in symptoms when he returned home. States that he had worsening dysuria associated with urinary hesitancy, suprapubic tenderness and right sided pulsating intermittent back pain. Reports fevers of 102.5 at home associated with chills, decreased PO intake and 1 episodes of NBNB emesis. Denies diarrhea or constipation. No previous hospitalizations. No previous h/o ESBL UTI.     ED course was complicated by hypotension 80s/50s. MICU evaluated and deemed not a MICU candidate. BP improved  100/60s.   In the Ed patient received Tylenol IV x1 dose, Ketorolac 15g x2, Ceftriaxone x 1 dose, Meropenem x 1 dose, Cefepime x 1 dose, Zofran x 1 dose, 4L NS bolus and 3L LR bolus.   Patient s/p Oh in CDU.     Of note, patient recently treated for right buttock lesion with Bactrim x 7 days. (11 Oct 2017 05:25)      MEDICATIONS  (STANDING):  cefTRIAXone   IVPB 1 Gram(s) IV Intermittent every 24 hours  lactated ringers. 1000 milliLiter(s) (150 mL/Hr) IV Continuous <Continuous>  nicotine -   7 mG/24Hr(s) Patch 1 patch Transdermal daily    MEDICATIONS  (PRN):  acetaminophen   Tablet 650 milliGRAM(s) Oral every 6 hours PRN For Temp greater than 38 C (100.4 F)  acetaminophen   Tablet. 650 milliGRAM(s) Oral every 6 hours PRN Mild Pain (1 - 3)      Vital Signs Last 24 Hrs  T(C): 38.5 (11 Oct 2017 04:37), Max: 39.1 (11 Oct 2017 04:13)  T(F): 101.3 (11 Oct 2017 04:37), Max: 102.4 (11 Oct 2017 04:13)  HR: 99 (11 Oct 2017 05:00) (94 - 124)  BP: 99/56 (11 Oct 2017 05:00) (80/59 - 130/40)  BP(mean): --  RR: 18 (11 Oct 2017 04:37) (16 - 19)  SpO2: 100% (11 Oct 2017 04:37) (97% - 100%)  CAPILLARY BLOOD GLUCOSE        I&O's Summary      PHYSICAL EXAM:  GENERAL: NAD, well-developed  HEAD:  Atraumatic, Normocephalic  EYES: EOMI, PERRLA, conjunctiva and sclera clear  NECK: Supple, No JVD  CHEST/LUNG: Clear to auscultation bilaterally; No wheeze  HEART: Regular rate and rhythm; No murmurs, rubs, or gallops  ABDOMEN: Soft, Nontender, Nondistended; Bowel sounds present, POSTIVE R costovertebral tenderness  EXTREMITIES:  2+ Peripheral Pulses, No clubbing, cyanosis, or edema  PSYCH: AAOx3  NEUROLOGY: non-focal  SKIN: No rashes or lesions    LABS:                        12.6   15.1  )-----------( CLUMPED    ( 11 Oct 2017 01:04 )             35.9     10-11    140  |  108  |  10  ----------------------------<  104<H>  3.7   |  21<L>  |  0.94    Ca    7.9<L>      11 Oct 2017 06:51  Phos  1.4     10-11  Mg     1.6     10-11    TPro  5.9<L>  /  Alb  3.1<L>  /  TBili  1.5<H>  /  DBili  x   /  AST  22  /  ALT  22  /  AlkPhos  87  10-11    PT/INR - ( 11 Oct 2017 06:52 )   PT: 13.7 sec;   INR: 1.25 ratio         PTT - ( 11 Oct 2017 06:52 )  PTT:28.1 sec  CARDIAC MARKERS ( 11 Oct 2017 01:04 )  x     / x     / 236 U/L / x     / x          Urinalysis Basic - ( 11 Oct 2017 01:04 )    Color: Yellow / Appearance: Clear / S.013 / pH: x  Gluc: x / Ketone: Trace  / Bili: Negative / Urobili: Negative   Blood: x / Protein: Trace / Nitrite: Negative   Leuk Esterase: Moderate / RBC: 3-5 /HPF / WBC 11-25 /HPF   Sq Epi: x / Non Sq Epi: OCC /HPF / Bacteria: Few /HPF        RADIOLOGY & ADDITIONAL TESTS:    Imaging Personally Reviewed:    Consultant(s) Notes Reviewed:      Care Discussed with Consultants/Other Providers:

## 2017-10-11 NOTE — ED CDU PROVIDER NOTE - DETAILS
Renal Colic  1. Frequent reevaluations  2. IV hydration  3. Pain management  4. IV Abx  Plan d/w Dr. Cuellar Pyelo  1. Frequent reevaluations  2. IV hydration  3. Pain management  4. IV Abx  Plan d/w Dr. Cuellar

## 2017-10-11 NOTE — H&P ADULT - ASSESSMENT
37 year old man ?kidney stones with abdominal sx presents to the ED with dysuria x 2days admitted with  sepsis 2/2 pyelonephritis.

## 2017-10-12 ENCOUNTER — OUTPATIENT (OUTPATIENT)
Dept: OUTPATIENT SERVICES | Facility: HOSPITAL | Age: 37
LOS: 1 days | Discharge: HOME | End: 2017-10-12

## 2017-10-12 ENCOUNTER — APPOINTMENT (OUTPATIENT)
Dept: HEMATOLOGY ONCOLOGY | Facility: CLINIC | Age: 37
End: 2017-10-12

## 2017-10-12 DIAGNOSIS — Z98.890 OTHER SPECIFIED POSTPROCEDURAL STATES: Chronic | ICD-10-CM

## 2017-10-12 DIAGNOSIS — D69.6 THROMBOCYTOPENIA, UNSPECIFIED: ICD-10-CM

## 2017-10-12 LAB
ALBUMIN SERPL ELPH-MCNC: 2.9 G/DL — LOW (ref 3.3–5)
ALP SERPL-CCNC: 152 U/L — HIGH (ref 40–120)
ALT FLD-CCNC: 20 U/L — SIGNIFICANT CHANGE UP (ref 10–45)
ANION GAP SERPL CALC-SCNC: 11 MMOL/L — SIGNIFICANT CHANGE UP (ref 5–17)
ANION GAP SERPL CALC-SCNC: 15 MMOL/L — SIGNIFICANT CHANGE UP (ref 5–17)
AST SERPL-CCNC: 25 U/L — SIGNIFICANT CHANGE UP (ref 10–40)
BASOPHILS # BLD AUTO: 0.01 K/UL — SIGNIFICANT CHANGE UP (ref 0–0.2)
BASOPHILS NFR BLD AUTO: 0.1 % — SIGNIFICANT CHANGE UP (ref 0–2)
BILIRUB SERPL-MCNC: 0.4 MG/DL — SIGNIFICANT CHANGE UP (ref 0.2–1.2)
BUN SERPL-MCNC: 11 MG/DL — SIGNIFICANT CHANGE UP (ref 7–23)
BUN SERPL-MCNC: 9 MG/DL — SIGNIFICANT CHANGE UP (ref 7–23)
CALCIUM SERPL-MCNC: 8.2 MG/DL — LOW (ref 8.4–10.5)
CALCIUM SERPL-MCNC: 9.1 MG/DL — SIGNIFICANT CHANGE UP (ref 8.4–10.5)
CHLORIDE SERPL-SCNC: 111 MMOL/L — HIGH (ref 96–108)
CHLORIDE SERPL-SCNC: 93 MMOL/L — LOW (ref 96–108)
CO2 SERPL-SCNC: 19 MMOL/L — LOW (ref 22–31)
CO2 SERPL-SCNC: 21 MMOL/L — LOW (ref 22–31)
CREAT SERPL-MCNC: 0.89 MG/DL — SIGNIFICANT CHANGE UP (ref 0.5–1.3)
CREAT SERPL-MCNC: 0.93 MG/DL — SIGNIFICANT CHANGE UP (ref 0.5–1.3)
CULTURE RESULTS: NO GROWTH — SIGNIFICANT CHANGE UP
EOSINOPHIL # BLD AUTO: 0.15 K/UL — SIGNIFICANT CHANGE UP (ref 0–0.5)
EOSINOPHIL NFR BLD AUTO: 1.2 % — SIGNIFICANT CHANGE UP (ref 0–6)
GLUCOSE SERPL-MCNC: 137 MG/DL — HIGH (ref 70–99)
GLUCOSE SERPL-MCNC: 160 MG/DL — HIGH (ref 70–99)
H PYLORI AB SER-ACNC: 60.9 UNITS — HIGH
H PYLORI IGA SER-ACNC: 80.6 UNITS — HIGH
H.PYLORI ANTIBODY IGA: 80.6 UNITS — HIGH (ref 0–20)
HAV IGM SER-ACNC: SIGNIFICANT CHANGE UP
HBV CORE IGM SER-ACNC: SIGNIFICANT CHANGE UP
HBV SURFACE AG SER-ACNC: SIGNIFICANT CHANGE UP
HCT VFR BLD CALC: 40.3 % — SIGNIFICANT CHANGE UP (ref 39–50)
HCV AB S/CO SERPL IA: 0.11 S/CO — SIGNIFICANT CHANGE UP
HCV AB SERPL-IMP: SIGNIFICANT CHANGE UP
HGB BLD-MCNC: 13.3 G/DL — SIGNIFICANT CHANGE UP (ref 13–17)
IMM GRANULOCYTES NFR BLD AUTO: 0.2 % — SIGNIFICANT CHANGE UP (ref 0–1.5)
LYMPHOCYTES # BLD AUTO: 1.77 K/UL — SIGNIFICANT CHANGE UP (ref 1–3.3)
LYMPHOCYTES # BLD AUTO: 14.1 % — SIGNIFICANT CHANGE UP (ref 13–44)
MCHC RBC-ENTMCNC: 29.9 PG — SIGNIFICANT CHANGE UP (ref 27–34)
MCHC RBC-ENTMCNC: 33 GM/DL — SIGNIFICANT CHANGE UP (ref 32–36)
MCV RBC AUTO: 90.6 FL — SIGNIFICANT CHANGE UP (ref 80–100)
MONOCYTES # BLD AUTO: 1.31 K/UL — HIGH (ref 0–0.9)
MONOCYTES NFR BLD AUTO: 10.5 % — SIGNIFICANT CHANGE UP (ref 2–14)
NEUTROPHILS # BLD AUTO: 9.26 K/UL — HIGH (ref 1.8–7.4)
NEUTROPHILS NFR BLD AUTO: 73.9 % — SIGNIFICANT CHANGE UP (ref 43–77)
PLATELET # BLD AUTO: SIGNIFICANT CHANGE UP (ref 150–400)
POTASSIUM SERPL-MCNC: 4 MMOL/L — SIGNIFICANT CHANGE UP (ref 3.5–5.3)
POTASSIUM SERPL-MCNC: 4.2 MMOL/L — SIGNIFICANT CHANGE UP (ref 3.5–5.3)
POTASSIUM SERPL-SCNC: 4 MMOL/L — SIGNIFICANT CHANGE UP (ref 3.5–5.3)
POTASSIUM SERPL-SCNC: 4.2 MMOL/L — SIGNIFICANT CHANGE UP (ref 3.5–5.3)
PROT SERPL-MCNC: 6.2 G/DL — SIGNIFICANT CHANGE UP (ref 6–8.3)
RBC # BLD: 4.45 M/UL — SIGNIFICANT CHANGE UP (ref 4.2–5.8)
RBC # FLD: 13.4 % — SIGNIFICANT CHANGE UP (ref 10.3–14.5)
SODIUM SERPL-SCNC: 127 MMOL/L — LOW (ref 135–145)
SODIUM SERPL-SCNC: 143 MMOL/L — SIGNIFICANT CHANGE UP (ref 135–145)
SPECIMEN SOURCE: SIGNIFICANT CHANGE UP
WBC # BLD: 12.53 K/UL — HIGH (ref 3.8–10.5)
WBC # FLD AUTO: 12.53 K/UL — HIGH (ref 3.8–10.5)

## 2017-10-12 PROCEDURE — 99233 SBSQ HOSP IP/OBS HIGH 50: CPT | Mod: GC

## 2017-10-12 PROCEDURE — 99222 1ST HOSP IP/OBS MODERATE 55: CPT

## 2017-10-12 RX ADMIN — CEFTRIAXONE 100 GRAM(S): 500 INJECTION, POWDER, FOR SOLUTION INTRAMUSCULAR; INTRAVENOUS at 21:54

## 2017-10-12 RX ADMIN — Medication 1 PATCH: at 16:21

## 2017-10-12 RX ADMIN — Medication 1 PATCH: at 16:22

## 2017-10-12 RX ADMIN — SODIUM CHLORIDE 150 MILLILITER(S): 9 INJECTION, SOLUTION INTRAVENOUS at 18:18

## 2017-10-12 NOTE — PROGRESS NOTE ADULT - PROBLEM SELECTOR PLAN 1
D/c blum  - C/w ceftriaxone day 2  - F/u urine culture sensitivities. D/c blum  - C/w ceftriaxone day 2  - F/u urine culture sensitivities and deescalate antibiotics.

## 2017-10-12 NOTE — PROGRESS NOTE ADULT - SUBJECTIVE AND OBJECTIVE BOX
Medicine Progress Note- PGY2    =========================================  CONTACT INFO  Reji Quiroga M.D., PGY-2  Pager: [phone]    Chief Complaint: Patient is a 37y old  Male who presents with a chief complaint of dysuria (11 Oct 2017 05:25)      INTERVAL HPI/OVERNIGHT EVENTS: Haptoglobin elevated, Fibrinogen elevated, LDH mildly elevated. Labs not c/w DIC. No acute events ON.    REVIEW OF SYSTEMS:   CONSTITUTIONAL:  Denies f nvd chills  HEENT:  Eyes:  Denies visual loss, blurred vision, double vision or scleral icterus. Ears, Nose, Throat:  Denies hearing loss, sneezing, congestion, runny nose or sore throat.  SKIN:  Denies rash or itching.  CARDIOVASCULAR:  Denies chest pain, NIEVES  RESPIRATORY:  Denies shortness of breath, cough or sputum.  GASTROINTESTINAL:  Denies anorexia, nausea, vomiting or diarrhea. No abdominal pain or blood.  GENITOURINARY:  Denies any hematuria, dysuria  NEUROLOGICAL:  Denies headache, dizziness, syncope, paralysis, ataxia, numbness or tingling in the extremities. No change in bowel or bladder control.  MUSCULOSKELETAL:  Denies muscle, back pain, joint pain or stiffness.  HEMATOLOGIC:  Denies anemia, bleeding or bruising.  LYMPHATICS:  Denies enlarged nodes.   PSYCHIATRIC:  Denies history of depression or anxiety.  ENDOCRINOLOGIC:  Denies reports of sweating, cold or heat intolerance. No polyuria or polydipsia.  ALLERGIES:  Denies history of asthma, hives, eczema or rhinitis.    MEDICATIONS  (STANDING):  cefTRIAXone   IVPB 1 Gram(s) IV Intermittent every 24 hours  lactated ringers. 1000 milliLiter(s) (150 mL/Hr) IV Continuous <Continuous>  nicotine -   7 mG/24Hr(s) Patch 1 patch Transdermal daily    MEDICATIONS  (PRN):  acetaminophen   Tablet 650 milliGRAM(s) Oral every 6 hours PRN For Temp greater than 38 C (100.4 F)  acetaminophen   Tablet. 650 milliGRAM(s) Oral every 6 hours PRN Mild Pain (1 - 3)      Vital Signs Last 24 Hrs  T(C): 37.5 (11 Oct 2017 20:00), Max: 37.5 (11 Oct 2017 20:00)  T(F): 99.5 (11 Oct 2017 20:00), Max: 99.5 (11 Oct 2017 20:00)  HR: 99 (11 Oct 2017 20:00) (67 - 99)  BP: 116/70 (11 Oct 2017 20:00) (96/55 - 116/70)  BP(mean): --  RR: 20 (11 Oct 2017 20:00) (18 - 20)  SpO2: 98% (11 Oct 2017 20:00) (97% - 98%)  Supplemental O2: [ ] No, on Room Air [ ] Yes,     I&O's Detail    11 Oct 2017 07:01  -  12 Oct 2017 07:00  --------------------------------------------------------  IN:    Oral Fluid: 240 mL  Total IN: 240 mL    OUT:    Indwelling Catheter - Urethral: 6300 mL  Total OUT: 6300 mL    Total NET: -6060 mL        CAPILLARY BLOOD GLUCOSE          PHYSICAL EXAM:  Daily Height in cm: 170.18 (11 Oct 2017 12:30)    Daily    GENERAL: NAD,   HEAD:  NC/AT  EYES: EOMI, white sclerae  ENMT: MMM, no oropharyngeal lesions or erythema appreciated, dentition well appearing  Neck: trachea midline, no appreciable masses  Pulm: normal work of breathing, CTA b/l  CV: S1&S2+, rrr, no m/r/g appreciated  ABDOMEN: soft, nt, nd,   : no cva tenderness, no blum placed  EXTREMITIES:  no appreciable edema in b/l LE  Neuro: A&Ox3, no focal deficits  SKIN: warm and dry, no visible rash    LABS:                        x      x     )-----------( 102      ( 12 Oct 2017 00:14 )             x        10-11    143  |  111<H>  |  11  ----------------------------<  160<H>  4.2   |  21<L>  |  0.93    Ca    8.2<L>      11 Oct 2017 22:43  Phos  1.4     10-11  Mg     1.6     10-11    TPro  5.9<L>  /  Alb  3.1<L>  /  TBili  1.5<H>  /  DBili  x   /  AST  22  /  ALT  22  /  AlkPhos  87  10-11    LIVER FUNCTIONS - ( 11 Oct 2017 06:51 )  Alb: 3.1 g/dL / Pro: 5.9 g/dL / ALK PHOS: 87 U/L / ALT: 22 U/L RC / AST: 22 U/L / GGT: x     / T. Bili 1.5 mg/dL / D. Bili x         PT/INR - ( 11 Oct 2017 19:55 )   PT: 12.7 sec;   INR: 1.17 ratio         PTT - ( 11 Oct 2017 19:55 )  PTT:25.6 sec  Urinalysis Basic - ( 11 Oct 2017 01:04 )    Color: Yellow / Appearance: Clear / S.013 / pH: x  Gluc: x / Ketone: Trace  / Bili: Negative / Urobili: Negative   Blood: x / Protein: Trace / Nitrite: Negative   Leuk Esterase: Moderate / RBC: 3-5 /HPF / WBC 11-25 /HPF   Sq Epi: x / Non Sq Epi: OCC /HPF / Bacteria: Few /HPF        ( 11 Oct 2017 01:04 )CARDIAC MARKERS    U/L<H>   CKMB x       CKMB Units x       Troponin T x            Microbiology:  Culture - Urine (10.11.17 @ 03:08)    Specimen Source: .Urine Clean Catch (Midstream)    Culture Results:   No growth        RADIOLOGY & ADDITIONAL TESTS: Medicine Progress Note- PGY2    =========================================  CONTACT INFO  Reji Quiroga M.D., PGY-2  Pager: [phone]    Chief Complaint: Patient is a 37y old  Male who presents with a chief complaint of dysuria (11 Oct 2017 05:25)      INTERVAL HPI/OVERNIGHT EVENTS: Haptoglobin elevated, Fibrinogen elevated, LDH mildly elevated. Labs not c/w DIC. No acute events ON.    REVIEW OF SYSTEMS:   CONSTITUTIONAL:  Denies f nvd chills  HEENT:  Eyes:  Denies visual loss, blurred vision, double vision or scleral icterus. Ears, Nose, Throat:  Denies hearing loss, sneezing, congestion, runny nose or sore throat.  SKIN:  Denies rash or itching.  CARDIOVASCULAR:  Denies chest pain, NIEVES  RESPIRATORY:  Denies shortness of breath, cough or sputum.  GASTROINTESTINAL:  Denies anorexia, nausea, vomiting or diarrhea. No abdominal pain or blood.  GENITOURINARY:  Denies any hematuria, dysuria  NEUROLOGICAL:  Denies headache, dizziness, syncope, paralysis, ataxia, numbness or tingling in the extremities. No change in bowel or bladder control.  MUSCULOSKELETAL:  Denies muscle, back pain, joint pain or stiffness.  HEMATOLOGIC:  Denies anemia, bleeding or bruising.  LYMPHATICS:  Denies enlarged nodes.   PSYCHIATRIC:  Denies history of depression or anxiety.  ENDOCRINOLOGIC:  Denies reports of sweating, cold or heat intolerance. No polyuria or polydipsia.  ALLERGIES:  Denies history of asthma, hives, eczema or rhinitis.    MEDICATIONS  (STANDING):  cefTRIAXone   IVPB 1 Gram(s) IV Intermittent every 24 hours  lactated ringers. 1000 milliLiter(s) (150 mL/Hr) IV Continuous <Continuous>  nicotine -   7 mG/24Hr(s) Patch 1 patch Transdermal daily    MEDICATIONS  (PRN):  acetaminophen   Tablet 650 milliGRAM(s) Oral every 6 hours PRN For Temp greater than 38 C (100.4 F)  acetaminophen   Tablet. 650 milliGRAM(s) Oral every 6 hours PRN Mild Pain (1 - 3)      Vital Signs Last 24 Hrs  T(C): 37.5 (11 Oct 2017 20:00), Max: 37.5 (11 Oct 2017 20:00)  T(F): 99.5 (11 Oct 2017 20:00), Max: 99.5 (11 Oct 2017 20:00)  HR: 99 (11 Oct 2017 20:00) (67 - 99)  BP: 116/70 (11 Oct 2017 20:00) (96/55 - 116/70)  BP(mean): --  RR: 20 (11 Oct 2017 20:00) (18 - 20)  SpO2: 98% (11 Oct 2017 20:00) (97% - 98%)  Supplemental O2: [ ] No, on Room Air [ ] Yes,     I&O's Detail    11 Oct 2017 07:01  -  12 Oct 2017 07:00  --------------------------------------------------------  IN:    Oral Fluid: 240 mL  Total IN: 240 mL    OUT:    Indwelling Catheter - Urethral: 6300 mL  Total OUT: 6300 mL    Total NET: -6060 mL        CAPILLARY BLOOD GLUCOSE          PHYSICAL EXAM:  Daily Height in cm: 170.18 (11 Oct 2017 12:30)    Daily    GENERAL: NAD,   HEAD:  NC/AT  EYES: EOMI, white sclerae  ENMT: MMM, no oropharyngeal lesions or erythema appreciated, dentition well appearing  Neck: trachea midline, no appreciable masses  Pulm: normal work of breathing, CTA b/l  CV: S1&S2+, rrr, no m/r/g appreciated  ABDOMEN: soft, nt, nd,   : Oh in place, clear urine.  EXTREMITIES:  no appreciable edema in b/l LE  Neuro: A&Ox3, no focal deficits  SKIN: warm and dry, no visible rash    LABS:                        x      x     )-----------( 102      ( 12 Oct 2017 00:14 )             x        10-11    143  |  111<H>  |  11  ----------------------------<  160<H>  4.2   |  21<L>  |  0.93    Ca    8.2<L>      11 Oct 2017 22:43  Phos  1.4     10-11  Mg     1.6     10-11    TPro  5.9<L>  /  Alb  3.1<L>  /  TBili  1.5<H>  /  DBili  x   /  AST  22  /  ALT  22  /  AlkPhos  87  10-11    LIVER FUNCTIONS - ( 11 Oct 2017 06:51 )  Alb: 3.1 g/dL / Pro: 5.9 g/dL / ALK PHOS: 87 U/L / ALT: 22 U/L RC / AST: 22 U/L / GGT: x     / T. Bili 1.5 mg/dL / D. Bili x         PT/INR - ( 11 Oct 2017 19:55 )   PT: 12.7 sec;   INR: 1.17 ratio         PTT - ( 11 Oct 2017 19:55 )  PTT:25.6 sec  Urinalysis Basic - ( 11 Oct 2017 01:04 )    Color: Yellow / Appearance: Clear / S.013 / pH: x  Gluc: x / Ketone: Trace  / Bili: Negative / Urobili: Negative   Blood: x / Protein: Trace / Nitrite: Negative   Leuk Esterase: Moderate / RBC: 3-5 /HPF / WBC 11-25 /HPF   Sq Epi: x / Non Sq Epi: OCC /HPF / Bacteria: Few /HPF        ( 11 Oct 2017 01:04 )CARDIAC MARKERS    U/L<H>   CKMB x       CKMB Units x       Troponin T x            Microbiology:  Culture - Urine (10.11.17 @ 03:08)    Specimen Source: .Urine Clean Catch (Midstream)    Culture Results:   No growth        RADIOLOGY & ADDITIONAL TESTS:

## 2017-10-12 NOTE — CONSULT NOTE ADULT - ATTENDING COMMENTS
Patient with pyelonephritis and thrombocytopenia (clumped in standard tube therefore citrated tube sent with a platelet count of 100k).  Mild thrombocytopenia likely in the setting of underlying infection.  Peripheral smear reviewed and platelet clumps noted even from citrated tube therefore actual platelet count likely higher than what is reported.  Would continue to monitor CBC.

## 2017-10-12 NOTE — CONSULT NOTE ADULT - SUBJECTIVE AND OBJECTIVE BOX
Hematology Consult Note    HPI:  37 year old man ?kidney stones with abdominal sx presents to the ED with dysuria x 2days. Of note, patient seen in ED  10/9 with similar complaints of dysuria, hematuria and penile discharged 4 hours prior to coming in. Patient was treated with 1 dose Ceftriaxone and Azithromycin for concern for STD and discharged with outpatient follow up for platelet clumping on CBC. Patient that he had no improvement in symptoms when he returned home. States that he had worsening dysuria associated with urinary hesitancy, suprapubic tenderness and right sided pulsating intermittent back pain. Reports fevers of 102.5 at home associated with chills, decreased PO intake and 1 episodes of NBNB emesis. Denies diarrhea or constipation. No previous hospitalizations. No previous h/o ESBL UTI.     ED course was complicated by hypotension 80s/50s. MICU evaluated and deemed not a MICU candidate. BP improved  100/60s.   In the Ed patient received Tylenol IV x1 dose, Ketorolac 15g x2, Ceftriaxone x 1 dose, Meropenem x 1 dose, Cefepime x 1 dose, Zofran x 1 dose, 4L NS bolus and 3L LR bolus.   Patient s/p Oh in CDU.     Of note, patient recently treated for right buttock lesion with Bactrim x 7 days. (11 Oct 2017 05:25)      Interval events:    Patient currently admitted for sepsis secondary to pyelonephritis, secondary to E. Coli. Patient s/p 1 dose Ceftriaxone, 1 dose Meropenem and 1 dose Cefepime, currently continued on ceftriaxone.      PAST MEDICAL & SURGICAL HISTORY:  Abscess: Right knee Abscess-MRSA  H/O abdominal surgery      FAMILY HISTORY:  No pertinent family history in first degree relatives      MEDICATIONS  (STANDING):  cefTRIAXone   IVPB 1 Gram(s) IV Intermittent every 24 hours  lactated ringers. 1000 milliLiter(s) (150 mL/Hr) IV Continuous <Continuous>  nicotine -   7 mG/24Hr(s) Patch 1 patch Transdermal daily    MEDICATIONS  (PRN):  acetaminophen   Tablet 650 milliGRAM(s) Oral every 6 hours PRN For Temp greater than 38 C (100.4 F)  acetaminophen   Tablet. 650 milliGRAM(s) Oral every 6 hours PRN Mild Pain (1 - 3)      Allergies    No Known Allergies    Intolerances        SOCIAL HISTORY: No EtOH, no tobacco    REVIEW OF SYSTEMS:    CONSTITUTIONAL: No weakness, fevers or chills  EYES/ENT: No visual changes;  No vertigo or throat pain   NECK: No pain or stiffness  RESPIRATORY: No cough, wheezing, hemoptysis; No shortness of breath  CARDIOVASCULAR: No chest pain or palpitations  GASTROINTESTINAL: No abdominal or epigastric pain. No nausea, vomiting, or hematemesis; No diarrhea or constipation. No melena or hematochezia.  GENITOURINARY: No dysuria, frequency or hematuria  NEUROLOGICAL: No numbness or weakness  SKIN: No itching, burning, rashes, or lesions   All other review of systems is negative unless indicated above.        T(F): 98.4 (10-12-17 @ 14:43), Max: 99.5 (10-11-17 @ 20:00)  HR: 66 (10-12-17 @ 14:43)  BP: 129/79 (10-12-17 @ 14:43)  RR: 18 (10-12-17 @ 14:43)  SpO2: 98% (10-12-17 @ 14:43)  Wt(kg): --    GENERAL: NAD, well-developed  HEAD:  Atraumatic, Normocephalic  EYES: EOMI, PERRLA, conjunctiva and sclera clear  NECK: Supple, No JVD  CHEST/LUNG: Clear to auscultation bilaterally; No wheeze  HEART: Regular rate and rhythm; No murmurs, rubs, or gallops  ABDOMEN: Soft, Nontender, Nondistended; Bowel sounds present  EXTREMITIES:  2+ Peripheral Pulses, No clubbing, cyanosis, or edema  NEUROLOGY: non-focal  SKIN: No rashes or lesions                          13.3   12.53 )-----------( Clumped    ( 12 Oct 2017 08:06 )             40.3       10-12    127<L>  |  93<L>  |  9   ----------------------------<  137<H>  4.0   |  19<L>  |  0.89    Ca    9.1      12 Oct 2017 08:10  Phos  1.4     10-11  Mg     1.6     10-11    TPro  6.2  /  Alb  2.9<L>  /  TBili  0.4  /  DBili  x   /  AST  25  /  ALT  20  /  AlkPhos  152<H>  10-12      Haptoglobin, Serum: 232 mg/dL (10-11 @ 22:43)  Lactate Dehydrogenase, Serum: 291 U/L (10-11 @ 19:55) Hematology Consult Note    HPI:  37 year old man ?kidney stones with abdominal sx presents to the ED with dysuria x 2days. Of note, patient seen in ED  10/9 with similar complaints of dysuria, hematuria and penile discharged 4 hours prior to coming in. Patient was treated with 1 dose Ceftriaxone and Azithromycin for concern for STD and discharged with outpatient follow up for platelet clumping on CBC. Patient that he had no improvement in symptoms when he returned home. States that he had worsening dysuria associated with urinary hesitancy, suprapubic tenderness and right sided pulsating intermittent back pain. Reports fevers of 102.5 at home associated with chills, decreased PO intake and 1 episodes of NBNB emesis. Denies diarrhea or constipation. No previous hospitalizations. No previous h/o ESBL UTI.     ED course was complicated by hypotension 80s/50s. MICU evaluated and deemed not a MICU candidate. BP improved  100/60s.   In the Ed patient received Tylenol IV x1 dose, Ketorolac 15g x2, Ceftriaxone x 1 dose, Meropenem x 1 dose, Cefepime x 1 dose, Zofran x 1 dose, 4L NS bolus and 3L LR bolus.   Patient s/p Oh in CDU.     Of note, patient recently treated for right buttock lesion with Bactrim x 7 days. (11 Oct 2017 05:25)      Interval events:    Patient currently admitted for sepsis secondary to pyelonephritis, secondary to E. Coli. Patient s/p 1 dose Ceftriaxone, 1 dose Meropenem and 1 dose Cefepime, currently continued on ceftriaxone. Patient persistently had clumped platelets for the first few blood draws, and when drawn with blue top tube, platelets found to be 88. Repeated counts also show plt of 102 and subsequently 90. There was concern for DIC, and the labs show elevated fibrinogen, mildly elevated INR and mildly elevated PTT.      PAST MEDICAL & SURGICAL HISTORY:  Abscess: Right knee Abscess-MRSA  H/O abdominal surgery      FAMILY HISTORY:  No pertinent family history in first degree relatives      MEDICATIONS  (STANDING):  cefTRIAXone   IVPB 1 Gram(s) IV Intermittent every 24 hours  lactated ringers. 1000 milliLiter(s) (150 mL/Hr) IV Continuous <Continuous>  nicotine -   7 mG/24Hr(s) Patch 1 patch Transdermal daily    MEDICATIONS  (PRN):  acetaminophen   Tablet 650 milliGRAM(s) Oral every 6 hours PRN For Temp greater than 38 C (100.4 F)  acetaminophen   Tablet. 650 milliGRAM(s) Oral every 6 hours PRN Mild Pain (1 - 3)      Allergies    No Known Allergies    Intolerances        SOCIAL HISTORY: No EtOH, no tobacco    REVIEW OF SYSTEMS:    CONSTITUTIONAL: No weakness, fevers or chills  EYES/ENT: No visual changes;  No vertigo or throat pain   NECK: No pain or stiffness  RESPIRATORY: No cough, wheezing, hemoptysis; No shortness of breath  CARDIOVASCULAR: No chest pain or palpitations  GASTROINTESTINAL: No abdominal or epigastric pain. No nausea, vomiting, or hematemesis; No diarrhea or constipation. No melena or hematochezia.  GENITOURINARY: No dysuria, frequency or hematuria  NEUROLOGICAL: No numbness or weakness  SKIN: No itching, burning, rashes, or lesions   All other review of systems is negative unless indicated above.        T(F): 98.4 (10-12-17 @ 14:43), Max: 99.5 (10-11-17 @ 20:00)  HR: 66 (10-12-17 @ 14:43)  BP: 129/79 (10-12-17 @ 14:43)  RR: 18 (10-12-17 @ 14:43)  SpO2: 98% (10-12-17 @ 14:43)  Wt(kg): --    GENERAL: NAD, well-developed  HEAD:  Atraumatic, Normocephalic  EYES: EOMI, PERRLA, conjunctiva and sclera clear  NECK: Supple, No JVD  CHEST/LUNG: Clear to auscultation bilaterally; No wheeze  HEART: Regular rate and rhythm; No murmurs, rubs, or gallops  ABDOMEN: Soft, mild suprapubic tenderness, Nondistended; Bowel sounds present  EXTREMITIES:  2+ Peripheral Pulses, No clubbing, cyanosis, or edema  NEUROLOGY: non-focal  SKIN: No rashes or lesions                          13.3   12.53 )-----------( Clumped    ( 12 Oct 2017 08:06 )             40.3       10-12    127<L>  |  93<L>  |  9   ----------------------------<  137<H>  4.0   |  19<L>  |  0.89    Ca    9.1      12 Oct 2017 08:10  Phos  1.4     10-11  Mg     1.6     10-11    TPro  6.2  /  Alb  2.9<L>  /  TBili  0.4  /  DBili  x   /  AST  25  /  ALT  20  /  AlkPhos  152<H>  10-12      Haptoglobin, Serum: 232 mg/dL (10-11 @ 22:43)  Lactate Dehydrogenase, Serum: 291 U/L (10-11 @ 19:55)

## 2017-10-12 NOTE — PROGRESS NOTE ADULT - PROBLEM SELECTOR PLAN 2
Plan as above  Continue treatement with Ceftriaxone 1gQD.  Follow up UCx and BCx. F/u Plan as above  Continue treatement with Ceftriaxone 1gQD.  Follow up UCx and BCx.

## 2017-10-12 NOTE — CONSULT NOTE ADULT - ASSESSMENT
38 yo [place] male with hx of MRSA cellulitis of R knee ( now resolved) p/w urosepsis c/b hypotension.      Pt is NOT a MICU candidate at this time. Please re-consult as needed.
37M with no significant PMHx presents with dysuria and flank pain, admitted for sepsis secodnary to pyelonephritis, with course complicated by thrombocytopenia.    1. Thrombocytopenia  - patient with platelets of initially 88, currently stable at 90  - thrombocytopenia may be secondary to sepsis  - continue to trend in blue top tubes    2. DIC  - labs may be consistent with mild DIC (with mild elevation in fibrin split products, Ptt and INR); PTT and INR may also be transiently elevated secondary to nutritional vit K deficiency  - likely secondary to underlying infection  - continue to treat pyelonephritits as per E. Coli sensitities  - continue to trend DIC panel     - no acute interventions at this time

## 2017-10-13 ENCOUNTER — TRANSCRIPTION ENCOUNTER (OUTPATIENT)
Age: 37
End: 2017-10-13

## 2017-10-13 VITALS
SYSTOLIC BLOOD PRESSURE: 110 MMHG | HEART RATE: 76 BPM | TEMPERATURE: 98 F | DIASTOLIC BLOOD PRESSURE: 71 MMHG | OXYGEN SATURATION: 97 % | RESPIRATION RATE: 18 BRPM

## 2017-10-13 LAB
ALBUMIN SERPL ELPH-MCNC: 2.9 G/DL — LOW (ref 3.3–5)
ALP SERPL-CCNC: 82 U/L — SIGNIFICANT CHANGE UP (ref 40–120)
ALT FLD-CCNC: 21 U/L — SIGNIFICANT CHANGE UP (ref 10–45)
ANION GAP SERPL CALC-SCNC: 11 MMOL/L — SIGNIFICANT CHANGE UP (ref 5–17)
AST SERPL-CCNC: 26 U/L — SIGNIFICANT CHANGE UP (ref 10–40)
BILIRUB SERPL-MCNC: 0.3 MG/DL — SIGNIFICANT CHANGE UP (ref 0.2–1.2)
BUN SERPL-MCNC: 8 MG/DL — SIGNIFICANT CHANGE UP (ref 7–23)
CALCIUM SERPL-MCNC: 9 MG/DL — SIGNIFICANT CHANGE UP (ref 8.4–10.5)
CHLORIDE SERPL-SCNC: 106 MMOL/L — SIGNIFICANT CHANGE UP (ref 96–108)
CO2 SERPL-SCNC: 25 MMOL/L — SIGNIFICANT CHANGE UP (ref 22–31)
CREAT SERPL-MCNC: 0.83 MG/DL — SIGNIFICANT CHANGE UP (ref 0.5–1.3)
GLUCOSE SERPL-MCNC: 99 MG/DL — SIGNIFICANT CHANGE UP (ref 70–99)
HCT VFR BLD CALC: 39.9 % — SIGNIFICANT CHANGE UP (ref 39–50)
HGB BLD-MCNC: 13.6 G/DL — SIGNIFICANT CHANGE UP (ref 13–17)
MAGNESIUM SERPL-MCNC: 2 MG/DL — SIGNIFICANT CHANGE UP (ref 1.6–2.6)
MCHC RBC-ENTMCNC: 30.3 PG — SIGNIFICANT CHANGE UP (ref 27–34)
MCHC RBC-ENTMCNC: 34.1 GM/DL — SIGNIFICANT CHANGE UP (ref 32–36)
MCV RBC AUTO: 88.9 FL — SIGNIFICANT CHANGE UP (ref 80–100)
PHOSPHATE SERPL-MCNC: 4 MG/DL — SIGNIFICANT CHANGE UP (ref 2.5–4.5)
PLATELET # BLD AUTO: SIGNIFICANT CHANGE UP (ref 150–400)
POTASSIUM SERPL-MCNC: 4.3 MMOL/L — SIGNIFICANT CHANGE UP (ref 3.5–5.3)
POTASSIUM SERPL-SCNC: 4.3 MMOL/L — SIGNIFICANT CHANGE UP (ref 3.5–5.3)
PROT SERPL-MCNC: 6.6 G/DL — SIGNIFICANT CHANGE UP (ref 6–8.3)
RBC # BLD: 4.49 M/UL — SIGNIFICANT CHANGE UP (ref 4.2–5.8)
RBC # FLD: 12.8 % — SIGNIFICANT CHANGE UP (ref 10.3–14.5)
SODIUM SERPL-SCNC: 142 MMOL/L — SIGNIFICANT CHANGE UP (ref 135–145)
WBC # BLD: 9.08 K/UL — SIGNIFICANT CHANGE UP (ref 3.8–10.5)
WBC # FLD AUTO: 9.08 K/UL — SIGNIFICANT CHANGE UP (ref 3.8–10.5)

## 2017-10-13 PROCEDURE — 96376 TX/PRO/DX INJ SAME DRUG ADON: CPT

## 2017-10-13 PROCEDURE — 80053 COMPREHEN METABOLIC PANEL: CPT

## 2017-10-13 PROCEDURE — 83605 ASSAY OF LACTIC ACID: CPT

## 2017-10-13 PROCEDURE — 86850 RBC ANTIBODY SCREEN: CPT

## 2017-10-13 PROCEDURE — 80074 ACUTE HEPATITIS PANEL: CPT

## 2017-10-13 PROCEDURE — 82803 BLOOD GASES ANY COMBINATION: CPT

## 2017-10-13 PROCEDURE — 82947 ASSAY GLUCOSE BLOOD QUANT: CPT

## 2017-10-13 PROCEDURE — 87486 CHLMYD PNEUM DNA AMP PROBE: CPT

## 2017-10-13 PROCEDURE — 85384 FIBRINOGEN ACTIVITY: CPT

## 2017-10-13 PROCEDURE — 82550 ASSAY OF CK (CPK): CPT

## 2017-10-13 PROCEDURE — 85362 FIBRIN DEGRADATION PRODUCTS: CPT

## 2017-10-13 PROCEDURE — 87633 RESP VIRUS 12-25 TARGETS: CPT

## 2017-10-13 PROCEDURE — 84295 ASSAY OF SERUM SODIUM: CPT

## 2017-10-13 PROCEDURE — 76775 US EXAM ABDO BACK WALL LIM: CPT

## 2017-10-13 PROCEDURE — 85049 AUTOMATED PLATELET COUNT: CPT

## 2017-10-13 PROCEDURE — 83010 ASSAY OF HAPTOGLOBIN QUANT: CPT

## 2017-10-13 PROCEDURE — 82330 ASSAY OF CALCIUM: CPT

## 2017-10-13 PROCEDURE — 99285 EMERGENCY DEPT VISIT HI MDM: CPT | Mod: 25

## 2017-10-13 PROCEDURE — 85027 COMPLETE CBC AUTOMATED: CPT

## 2017-10-13 PROCEDURE — 96375 TX/PRO/DX INJ NEW DRUG ADDON: CPT

## 2017-10-13 PROCEDURE — 82435 ASSAY OF BLOOD CHLORIDE: CPT

## 2017-10-13 PROCEDURE — 84132 ASSAY OF SERUM POTASSIUM: CPT

## 2017-10-13 PROCEDURE — 85730 THROMBOPLASTIN TIME PARTIAL: CPT

## 2017-10-13 PROCEDURE — 86677 HELICOBACTER PYLORI ANTIBODY: CPT

## 2017-10-13 PROCEDURE — 80307 DRUG TEST PRSMV CHEM ANLYZR: CPT

## 2017-10-13 PROCEDURE — 85014 HEMATOCRIT: CPT

## 2017-10-13 PROCEDURE — 87581 M.PNEUMON DNA AMP PROBE: CPT

## 2017-10-13 PROCEDURE — 85610 PROTHROMBIN TIME: CPT

## 2017-10-13 PROCEDURE — 99239 HOSP IP/OBS DSCHRG MGMT >30: CPT

## 2017-10-13 PROCEDURE — 87798 DETECT AGENT NOS DNA AMP: CPT

## 2017-10-13 PROCEDURE — 82150 ASSAY OF AMYLASE: CPT

## 2017-10-13 PROCEDURE — 83615 LACTATE (LD) (LDH) ENZYME: CPT

## 2017-10-13 PROCEDURE — 85045 AUTOMATED RETICULOCYTE COUNT: CPT

## 2017-10-13 PROCEDURE — 81001 URINALYSIS AUTO W/SCOPE: CPT

## 2017-10-13 PROCEDURE — 80048 BASIC METABOLIC PNL TOTAL CA: CPT

## 2017-10-13 PROCEDURE — 86901 BLOOD TYPING SEROLOGIC RH(D): CPT

## 2017-10-13 PROCEDURE — 71045 X-RAY EXAM CHEST 1 VIEW: CPT

## 2017-10-13 PROCEDURE — 84100 ASSAY OF PHOSPHORUS: CPT

## 2017-10-13 PROCEDURE — 86900 BLOOD TYPING SEROLOGIC ABO: CPT

## 2017-10-13 PROCEDURE — 96374 THER/PROPH/DIAG INJ IV PUSH: CPT

## 2017-10-13 PROCEDURE — 83735 ASSAY OF MAGNESIUM: CPT

## 2017-10-13 PROCEDURE — 87040 BLOOD CULTURE FOR BACTERIA: CPT

## 2017-10-13 PROCEDURE — 87086 URINE CULTURE/COLONY COUNT: CPT

## 2017-10-13 PROCEDURE — 83690 ASSAY OF LIPASE: CPT

## 2017-10-13 RX ORDER — INFLUENZA VIRUS VACCINE 15; 15; 15; 15 UG/.5ML; UG/.5ML; UG/.5ML; UG/.5ML
0.5 SUSPENSION INTRAMUSCULAR ONCE
Qty: 0 | Refills: 0 | Status: DISCONTINUED | OUTPATIENT
Start: 2017-10-13 | End: 2017-10-13

## 2017-10-13 RX ORDER — MOXIFLOXACIN HYDROCHLORIDE TABLETS, 400 MG 400 MG/1
1 TABLET, FILM COATED ORAL
Qty: 22 | Refills: 0 | OUTPATIENT
Start: 2017-10-13 | End: 2017-10-24

## 2017-10-13 NOTE — PROGRESS NOTE ADULT - PROBLEM SELECTOR PLAN 2
- no longer sepsis criteria, afebrile, normotensive  Plan as above  Continue treatement with Ceftriaxone 1gQD.

## 2017-10-13 NOTE — DISCHARGE NOTE ADULT - PLAN OF CARE
Stable You came to the hospital with an infection of your urine which affected your kidney. You were treated with antibiotics in the hospital and will continue taking the antibiotics for 11 days after you leave the hospital. Please follow up with the medicine clinic within 1 month. You have a low platelet count in your blood and it is important you follow up with hematology. Please call [phone] when you leave to set up an appointment.

## 2017-10-13 NOTE — PROGRESS NOTE ADULT - PROBLEM SELECTOR PLAN 4
Resolved s/p fluid administration.
Hold off on A/C pending platelets blue top to assess for thrombocytopenia.
Resolved s/p fluid administration.

## 2017-10-13 NOTE — PROGRESS NOTE ADULT - PROBLEM SELECTOR PLAN 5
Hold off on A/C pending platelets blue top to assess for thrombocytopenia.
Hold off on A/C pending platelets blue top to assess for thrombocytopenia.

## 2017-10-13 NOTE — PROGRESS NOTE ADULT - PROBLEM SELECTOR PLAN 1
- C/w ceftriaxone day 3 today, urine cx 10/9 shows e.coli pan sensitive - ceftriaxone transition to cipro to complete 14 day course given complicated UTI (in man).

## 2017-10-13 NOTE — PROGRESS NOTE ADULT - SUBJECTIVE AND OBJECTIVE BOX
Patient is a 37y old  Male who presents with a chief complaint of dysuria (11 Oct 2017 05:25)      SUBJECTIVE / OVERNIGHT EVENTS:    Pt reports no concerns overnight, in no pain, no fever, no dysuria.     MEDICATIONS  (STANDING):  cefTRIAXone   IVPB 1 Gram(s) IV Intermittent every 24 hours  lactated ringers. 1000 milliLiter(s) (150 mL/Hr) IV Continuous <Continuous>  nicotine -   7 mG/24Hr(s) Patch 1 patch Transdermal daily    MEDICATIONS  (PRN):  acetaminophen   Tablet 650 milliGRAM(s) Oral every 6 hours PRN For Temp greater than 38 C (100.4 F)  acetaminophen   Tablet. 650 milliGRAM(s) Oral every 6 hours PRN Mild Pain (1 - 3)      Vital Signs Last 24 Hrs  T(C): 36.8 (13 Oct 2017 06:24), Max: 36.9 (12 Oct 2017 14:43)  T(F): 98.2 (13 Oct 2017 06:24), Max: 98.4 (12 Oct 2017 14:43)  HR: 76 (13 Oct 2017 06:24) (66 - 76)  BP: 110/71 (13 Oct 2017 06:24) (110/71 - 129/79)  BP(mean): --  RR: 18 (13 Oct 2017 06:24) (18 - 18)  SpO2: 97% (13 Oct 2017 06:24) (97% - 98%)  CAPILLARY BLOOD GLUCOSE        I&O's Summary    12 Oct 2017 07:01  -  13 Oct 2017 07:00  --------------------------------------------------------  IN: 4730 mL / OUT: 3950 mL / NET: 780 mL        PHYSICAL EXAM:  GENERAL: NAD, well-developed  HEAD:  Atraumatic, Normocephalic  EYES: EOMI, PERRLA, conjunctiva and sclera clear  NECK: Supple, No JVD  CHEST/LUNG: Clear to auscultation bilaterally; No wheeze  HEART: Regular rate and rhythm; No murmurs, rubs, or gallops  ABDOMEN: Soft, Nontender, Nondistended; Bowel sounds present, no suprapubic tenderness  EXTREMITIES:  2+ Peripheral Pulses, No clubbing, cyanosis, or edema  PSYCH: AAOx3  NEUROLOGY: non-focal  SKIN: No rashes or lesions    LABS:                        x      x     )-----------( CLUMPED    ( 13 Oct 2017 07:00 )             x        10-12    127<L>  |  93<L>  |  9   ----------------------------<  137<H>  4.0   |  19<L>  |  0.89    Ca    9.1      12 Oct 2017 08:10    TPro  6.2  /  Alb  2.9<L>  /  TBili  0.4  /  DBili  x   /  AST  25  /  ALT  20  /  AlkPhos  152<H>  10-12    PT/INR - ( 11 Oct 2017 19:55 )   PT: 12.7 sec;   INR: 1.17 ratio         PTT - ( 11 Oct 2017 19:55 )  PTT:25.6 sec          RADIOLOGY & ADDITIONAL TESTS:    Imaging Personally Reviewed:    Consultant(s) Notes Reviewed:      Care Discussed with Consultants/Other Providers: Patient is a 37y old  Male who presents with a chief complaint of dysuria (11 Oct 2017 05:25)      SUBJECTIVE / OVERNIGHT EVENTS:  Pt reports no concerns overnight, in no pain, no fever, no dysuria.     MEDICATIONS  (STANDING):  cefTRIAXone   IVPB 1 Gram(s) IV Intermittent every 24 hours  lactated ringers. 1000 milliLiter(s) (150 mL/Hr) IV Continuous <Continuous>  nicotine -   7 mG/24Hr(s) Patch 1 patch Transdermal daily    MEDICATIONS  (PRN):  acetaminophen   Tablet 650 milliGRAM(s) Oral every 6 hours PRN For Temp greater than 38 C (100.4 F)  acetaminophen   Tablet. 650 milliGRAM(s) Oral every 6 hours PRN Mild Pain (1 - 3)    Vital Signs Last 24 Hrs  T(C): 36.8 (13 Oct 2017 06:24), Max: 36.9 (12 Oct 2017 14:43)  T(F): 98.2 (13 Oct 2017 06:24), Max: 98.4 (12 Oct 2017 14:43)  HR: 76 (13 Oct 2017 06:24) (66 - 76)  BP: 110/71 (13 Oct 2017 06:24) (110/71 - 129/79)  BP(mean): --  RR: 18 (13 Oct 2017 06:24) (18 - 18)  SpO2: 97% (13 Oct 2017 06:24) (97% - 98%)  CAPILLARY BLOOD GLUCOSE        I&O's Summary    12 Oct 2017 07:01  -  13 Oct 2017 07:00  --------------------------------------------------------  IN: 4730 mL / OUT: 3950 mL / NET: 780 mL        PHYSICAL EXAM:  GENERAL: NAD, well-developed  HEAD:  Atraumatic, Normocephalic  EYES: EOMI, PERRLA, conjunctiva and sclera clear  NECK: Supple, No JVD  CHEST/LUNG: Clear to auscultation bilaterally; No wheeze  HEART: Regular rate and rhythm; No murmurs, rubs, or gallops  ABDOMEN: Soft, Nontender, Nondistended; Bowel sounds present, no suprapubic tenderness  EXTREMITIES:  2+ Peripheral Pulses, No clubbing, cyanosis, or edema  PSYCH: AAOx3  NEUROLOGY: non-focal, 5/5 strength in UE and LE b/l, well appearing. AO x3. Reflexes 2+ b/l. No clonus or babinski.  SKIN: No rashes or lesions    LABS:                        x      x     )-----------( CLUMPED    ( 13 Oct 2017 07:00 )             x        10-12    127<L>  |  93<L>  |  9   ----------------------------<  137<H>  4.0   |  19<L>  |  0.89    Ca    9.1      12 Oct 2017 08:10    TPro  6.2  /  Alb  2.9<L>  /  TBili  0.4  /  DBili  x   /  AST  25  /  ALT  20  /  AlkPhos  152<H>  10-12    PT/INR - ( 11 Oct 2017 19:55 )   PT: 12.7 sec;   INR: 1.17 ratio         PTT - ( 11 Oct 2017 19:55 )  PTT:25.6 sec    RADIOLOGY & ADDITIONAL TESTS:    Imaging Personally Reviewed: yes    Consultant(s) Notes Reviewed:  yes

## 2017-10-13 NOTE — DISCHARGE NOTE ADULT - PROVIDER TOKENS
FREE:[LAST:[[facility]],PHONE:[(   )    -],FAX:[(   )    -],ADDRESS:[[facility]  [phone]]]

## 2017-10-13 NOTE — DISCHARGE NOTE ADULT - MEDICATION SUMMARY - MEDICATIONS TO TAKE
I will START or STAY ON the medications listed below when I get home from the hospital:    Cipro 500 mg oral tablet  -- 1 tab(s) by mouth every 12 hours   -- Avoid prolonged or excessive exposure to direct and/or artificial sunlight while taking this medication.  Check with your doctor before becoming pregnant.  Do not take dairy products, antacids, or iron preparations within one hour of this medication.  Finish all this medication unless otherwise directed by prescriber.  Medication should be taken with plenty of water.    -- Indication: For UTI

## 2017-10-13 NOTE — PROGRESS NOTE ADULT - PROBLEM SELECTOR PLAN 3
Platelets 90 k  Haptoglobin elevated, Fibrinogen elevated, LDH mildly elevated. INR elevated, mild DIC most likely 2/2   - continue to trend DIC panel and platelets Platelets 90 k  - likely in setting of illness

## 2017-10-13 NOTE — PROGRESS NOTE ADULT - ASSESSMENT
37 year old man ?kidney stones with abdominal sx presents to the ED with dysuria x 2days admitted with sepsis 2/2 pyelonephritis stable t/w ceftriaxone for pan-sensitive E.coli UTI 37 year old man ?kidney stones with abdominal sx presents to the ED with dysuria x 2days admitted with sepsis 2/2 pyelonephritis stable t/w ceftriaxone for pan-sensitive E.coli UTI, will transition to cipro given excellent  penetration and d/c.

## 2017-10-13 NOTE — DISCHARGE NOTE ADULT - HOSPITAL COURSE
The patient is a 37 year old man present with abdominal pain and dysuria after recent discharge from the [facility] ED on 10/9 for similar symptoms. The patient was treated on 10/9 with 1 dose of ceftriaxone and azithromycin for concern for STD and discharged with follow up for platelet clumping on CBC. The patient stated he had no improvement in symptoms when he returned home and started having hematuria with urinary hesitancy suprapubic tenderness. He had fevers of 102.5 at home associated with chills, decreased po intake and 1 episode of NBNB emesis. His ED course was complicated by hypotension 80s/50s. The MICU evaluated the patient and deemed him not a MICU cadidate. He received Tylenol IV x1 dose, Ketorolac 15g x2, Ceftriaxone x 1 dose, Meropenem x 1 dose, Cefepime x 1 dose, Zofran x 1 dose, 4L NS bolus and 3L LR bolus and a blum was placed in the ED before being admitted to medicine. The patient had a CT abd/pelvis which showed no nephroureterolithiasis or hydroureteronephrosis. On the medicine floor we continued with ceftriaxone 1 g for pyelonephritis because the sensitivites for the urine culture from 10/9 came back positive for pan-sensitive E.coli. The hematology team was consulted for the patient's thrombocytopenia which was most likely secondary to sepsis. The patient's platelets continued to clump once being sent in blue tops prompting outpatient hematology follow up. The patient also had labs mildly consistent with DIC (mild elevation in fibrin split products, Ptt and INR) likely secondary to infection. After clinical improvement in the patient with no febrile events, downtrending leukocytosis, no suprapubic tenderness, no dysuria, the patient was cleared for discharge on 11 day course of ciprofloxacin with follow up with hematology. The patient is a 37 year old man present with abdominal pain and dysuria after recent discharge from the [facility] ED on 10/9 for similar symptoms. The patient was treated on 10/9 with 1 dose of ceftriaxone and azithromycin for concern for STD and discharged with follow up for platelet clumping on CBC. The patient stated he had no improvement in symptoms when he returned home and started having hematuria with urinary hesitancy suprapubic tenderness. He had fevers of 102.5 at home associated with chills, decreased po intake and 1 episode of NBNB emesis. His ED course was complicated by hypotension 80s/50s. The MICU evaluated the patient and deemed him not a MICU cadidate. He received Tylenol IV x1 dose, Ketorolac 15g x2, Ceftriaxone x 1 dose, Meropenem x 1 dose, Cefepime x 1 dose, Zofran x 1 dose, 4L NS bolus and 3L LR bolus and a blum was placed in the ED before being admitted to medicine. The patient had a CT abd/pelvis which showed no nephroureterolithiasis or hydroureteronephrosis. On the medicine floor we continued with ceftriaxone 1 g for pyelonephritis because the sensitivites for the urine culture from 10/9 came back positive for pan-sensitive E.coli. The hematology team was consulted for the patient's thrombocytopenia which was most likely secondary to sepsis. The patient's platelets continued to clump once being sent in blue tops prompting outpatient hematology follow up. The patient also had labs mildly consistent with DIC (mild elevation in fibrin split products, Ptt and INR) likely secondary to infection. After clinical improvement in the patient with no febrile events, downtrending leukocytosis, no suprapubic tenderness and tolerating PO well, the patient was cleared for discharge on 11 day course of ciprofloxacin with follow up with hematology.       >40 minutes spent coordinating discharge.

## 2017-10-13 NOTE — PROGRESS NOTE ADULT - ATTENDING COMMENTS
Agree with above w/ following additions: 1L NS bolus now w/ systolics in 90s. E Coli in urine, will cover w/ CTX and narrow pending sensitivies tmrrw. Anemia hgb 12.6 likely 2ndary 7L hemodilution and w/ known thrombocytopenia, was actually supposed to f/u w/ heme today. Though now w/ illness, acute anemia and thrombocytopenia w/ mild increase in bili will send off DIC panel to ensure no DIC: if fibrinogen < 100 or decreased haptoglobin will call [facility] for urgent consult to r/o DIC. For now w/ out NADER or fever and alternative explanations for cytopenias as above I have a relatively low suspicion for DIC, more likely UTI sepsis. Unclear etiology of UTI in male c/b sepsis, happened in past he said. Remove blum please.
Agree with above. Cont CTX for now w/ sensitivities back, cont fluid, will begin thrombocytopenia eval here. Unusually dramatic response to UTIs in such young and otherwise healthy host, will discuss reasons why this might be the case. Rest as above.
I agree with above. Mr. Bauer has looked clinically excellent since yesterday AM: kept in house x 24 hours just to monitor after a particularly severe hemodynamic insult from his UTI. Tolerating PO well, HD stable, non toxic appearing- watching videos on his iPhone. EColi sensitivities back, will transition to cipro to complete 14 day course. His Na127 was spurious, remains in 140s. 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 Either way, for right now he will be on cipro: once his infection clears perhaps his thrombocytopenia will resolve. This seems to be the most likely scenario. HIV/Hep panel negative. Would recommend empiric triple therapy to eradicate H Pylori and, if thrombocytopenia persists, particularly w/ monocytosis, would probably get a bone marrow. Defer H Pylori tx to outpatient as we will be treating him w/ cipro now. Needs heme appointment soon. Rest as above.

## 2017-10-13 NOTE — PROGRESS NOTE ADULT - PROBLEM SELECTOR PROBLEM 2
Sepsis due to Escherichia coli (E. coli)

## 2017-10-13 NOTE — DISCHARGE NOTE ADULT - CARE PLAN
Principal Discharge DX:	Pyelonephritis  Goal:	Stable  Instructions for follow-up, activity and diet:	You came to the hospital with an infection of your urine which affected your kidney. You were treated with antibiotics in the hospital and will continue taking the antibiotics for 11 days after you leave the hospital. Please follow up with the medicine clinic within 1 month.  Secondary Diagnosis:	Thrombocytopenia  Instructions for follow-up, activity and diet:	You have a low platelet count in your blood and it is important you follow up with hematology. Please call [phone] when you leave to set up an appointment.

## 2017-10-16 LAB
CULTURE RESULTS: SIGNIFICANT CHANGE UP
CULTURE RESULTS: SIGNIFICANT CHANGE UP
SPECIMEN SOURCE: SIGNIFICANT CHANGE UP
SPECIMEN SOURCE: SIGNIFICANT CHANGE UP

## 2017-10-23 ENCOUNTER — RESULT REVIEW (OUTPATIENT)
Age: 37
End: 2017-10-23

## 2017-10-23 ENCOUNTER — APPOINTMENT (OUTPATIENT)
Dept: HEMATOLOGY ONCOLOGY | Facility: CLINIC | Age: 37
End: 2017-10-23

## 2017-10-23 VITALS
HEART RATE: 69 BPM | BODY MASS INDEX: 26.16 KG/M2 | TEMPERATURE: 98.5 F | WEIGHT: 174.61 LBS | SYSTOLIC BLOOD PRESSURE: 110 MMHG | HEIGHT: 68.7 IN | OXYGEN SATURATION: 100 % | RESPIRATION RATE: 14 BRPM | DIASTOLIC BLOOD PRESSURE: 72 MMHG

## 2017-10-23 LAB
ALBUMIN SERPL ELPH-MCNC: 4.4 G/DL — SIGNIFICANT CHANGE UP (ref 3.3–5)
ALP SERPL-CCNC: 123 U/L — HIGH (ref 40–120)
ALT FLD-CCNC: 76 U/L — HIGH (ref 10–45)
ANION GAP SERPL CALC-SCNC: 15 MMOL/L — SIGNIFICANT CHANGE UP (ref 5–17)
APTT BLD: 28.1 SEC — SIGNIFICANT CHANGE UP (ref 27.5–37.4)
AST SERPL-CCNC: 26 U/L — SIGNIFICANT CHANGE UP (ref 10–40)
BILIRUB SERPL-MCNC: 0.4 MG/DL — SIGNIFICANT CHANGE UP (ref 0.2–1.2)
BUN SERPL-MCNC: 13 MG/DL — SIGNIFICANT CHANGE UP (ref 7–23)
CALCIUM SERPL-MCNC: 9.3 MG/DL — SIGNIFICANT CHANGE UP (ref 8.4–10.5)
CHLORIDE SERPL-SCNC: 104 MMOL/L — SIGNIFICANT CHANGE UP (ref 96–108)
CO2 SERPL-SCNC: 25 MMOL/L — SIGNIFICANT CHANGE UP (ref 22–31)
CREAT SERPL-MCNC: 0.89 MG/DL — SIGNIFICANT CHANGE UP (ref 0.5–1.3)
GLUCOSE SERPL-MCNC: 88 MG/DL — SIGNIFICANT CHANGE UP (ref 70–99)
INR BLD: 0.97 RATIO — SIGNIFICANT CHANGE UP (ref 0.88–1.16)
POTASSIUM SERPL-MCNC: 4.6 MMOL/L — SIGNIFICANT CHANGE UP (ref 3.5–5.3)
POTASSIUM SERPL-SCNC: 4.6 MMOL/L — SIGNIFICANT CHANGE UP (ref 3.5–5.3)
PROT SERPL-MCNC: 7.2 G/DL — SIGNIFICANT CHANGE UP (ref 6–8.3)
PROTHROM AB SERPL-ACNC: 10.9 SEC — SIGNIFICANT CHANGE UP (ref 10–13.1)
SODIUM SERPL-SCNC: 144 MMOL/L — SIGNIFICANT CHANGE UP (ref 135–145)

## 2018-01-10 ENCOUNTER — APPOINTMENT (OUTPATIENT)
Dept: INTERNAL MEDICINE | Facility: CLINIC | Age: 38
End: 2018-01-10

## 2018-01-10 ENCOUNTER — LABORATORY RESULT (OUTPATIENT)
Age: 38
End: 2018-01-10

## 2018-01-10 ENCOUNTER — OUTPATIENT (OUTPATIENT)
Dept: OUTPATIENT SERVICES | Facility: HOSPITAL | Age: 38
LOS: 1 days | End: 2018-01-10
Payer: SELF-PAY

## 2018-01-10 VITALS
OXYGEN SATURATION: 98 % | HEART RATE: 80 BPM | WEIGHT: 174 LBS | DIASTOLIC BLOOD PRESSURE: 80 MMHG | HEIGHT: 68.7 IN | SYSTOLIC BLOOD PRESSURE: 124 MMHG | BODY MASS INDEX: 26.07 KG/M2

## 2018-01-10 DIAGNOSIS — Z86.19 PERSONAL HISTORY OF OTHER INFECTIOUS AND PARASITIC DISEASES: ICD-10-CM

## 2018-01-10 DIAGNOSIS — Z98.890 OTHER SPECIFIED POSTPROCEDURAL STATES: Chronic | ICD-10-CM

## 2018-01-10 DIAGNOSIS — I10 ESSENTIAL (PRIMARY) HYPERTENSION: ICD-10-CM

## 2018-01-10 LAB
ALBUMIN SERPL ELPH-MCNC: 4.8 G/DL — SIGNIFICANT CHANGE UP (ref 3.3–5)
ALP SERPL-CCNC: 89 U/L — SIGNIFICANT CHANGE UP (ref 40–120)
ALT FLD-CCNC: 59 U/L — HIGH (ref 10–45)
ANION GAP SERPL CALC-SCNC: 17 MMOL/L — SIGNIFICANT CHANGE UP (ref 5–17)
AST SERPL-CCNC: 30 U/L — SIGNIFICANT CHANGE UP (ref 10–40)
BILIRUB SERPL-MCNC: 0.2 MG/DL — SIGNIFICANT CHANGE UP (ref 0.2–1.2)
BUN SERPL-MCNC: 11 MG/DL — SIGNIFICANT CHANGE UP (ref 7–23)
CALCIUM SERPL-MCNC: 10.1 MG/DL — SIGNIFICANT CHANGE UP (ref 8.4–10.5)
CHLORIDE SERPL-SCNC: 102 MMOL/L — SIGNIFICANT CHANGE UP (ref 96–108)
CHOLEST SERPL-MCNC: 257 MG/DL — HIGH (ref 10–199)
CO2 SERPL-SCNC: 26 MMOL/L — SIGNIFICANT CHANGE UP (ref 22–31)
CREAT SERPL-MCNC: 0.96 MG/DL — SIGNIFICANT CHANGE UP (ref 0.5–1.3)
GLUCOSE SERPL-MCNC: 87 MG/DL — SIGNIFICANT CHANGE UP (ref 70–99)
HCT VFR BLD CALC: 50.1 % — HIGH (ref 39–50)
HDLC SERPL-MCNC: 68 MG/DL — SIGNIFICANT CHANGE UP (ref 40–125)
HGB BLD-MCNC: 15.9 G/DL — SIGNIFICANT CHANGE UP (ref 13–17)
LIPID PNL WITH DIRECT LDL SERPL: 164 MG/DL — HIGH
MCHC RBC-ENTMCNC: 30.1 PG — SIGNIFICANT CHANGE UP (ref 27–34)
MCHC RBC-ENTMCNC: 31.7 GM/DL — LOW (ref 32–36)
MCV RBC AUTO: 94.7 FL — SIGNIFICANT CHANGE UP (ref 80–100)
PLATELET # BLD AUTO: 181 K/UL — SIGNIFICANT CHANGE UP (ref 150–400)
POTASSIUM SERPL-MCNC: 4.9 MMOL/L — SIGNIFICANT CHANGE UP (ref 3.5–5.3)
POTASSIUM SERPL-SCNC: 4.9 MMOL/L — SIGNIFICANT CHANGE UP (ref 3.5–5.3)
PROT SERPL-MCNC: 8 G/DL — SIGNIFICANT CHANGE UP (ref 6–8.3)
RBC # BLD: 5.29 M/UL — SIGNIFICANT CHANGE UP (ref 4.2–5.8)
RBC # FLD: 13.5 % — SIGNIFICANT CHANGE UP (ref 10.3–14.5)
SODIUM SERPL-SCNC: 145 MMOL/L — SIGNIFICANT CHANGE UP (ref 135–145)
TOTAL CHOLESTEROL/HDL RATIO MEASUREMENT: 3.8 RATIO — SIGNIFICANT CHANGE UP (ref 3.4–9.6)
TRIGL SERPL-MCNC: 127 MG/DL — SIGNIFICANT CHANGE UP (ref 10–149)
WBC # BLD: 8.26 K/UL — SIGNIFICANT CHANGE UP (ref 3.8–10.5)
WBC # FLD AUTO: 8.26 K/UL — SIGNIFICANT CHANGE UP (ref 3.8–10.5)

## 2018-01-10 PROCEDURE — 80053 COMPREHEN METABOLIC PANEL: CPT

## 2018-01-10 PROCEDURE — 80061 LIPID PANEL: CPT

## 2018-01-10 PROCEDURE — 82652 VIT D 1 25-DIHYDROXY: CPT

## 2018-01-10 PROCEDURE — G0463: CPT

## 2018-01-10 PROCEDURE — 83036 HEMOGLOBIN GLYCOSYLATED A1C: CPT

## 2018-01-10 PROCEDURE — 85027 COMPLETE CBC AUTOMATED: CPT

## 2018-01-10 RX ORDER — SULFAMETHOXAZOLE AND TRIMETHOPRIM 800; 160 MG/1; MG/1
800-160 TABLET ORAL TWICE DAILY
Qty: 14 | Refills: 0 | Status: DISCONTINUED | COMMUNITY
Start: 2017-09-29 | End: 2018-01-10

## 2018-01-11 LAB
HBA1C BLD-MCNC: 5.5 % — SIGNIFICANT CHANGE UP (ref 4–5.6)
VIT D25+D1,25 OH+D1,25 PNL SERPL-MCNC: 49.2 PG/ML — SIGNIFICANT CHANGE UP (ref 19.9–79.3)

## 2018-01-16 ENCOUNTER — FORM ENCOUNTER (OUTPATIENT)
Age: 38
End: 2018-01-16

## 2018-01-16 LAB
C DIFF TOX GENS STL QL NAA+PROBE: NORMAL
CDIFF BY PCR: DETECTED

## 2018-01-17 ENCOUNTER — OUTPATIENT (OUTPATIENT)
Dept: OUTPATIENT SERVICES | Facility: HOSPITAL | Age: 38
LOS: 1 days | End: 2018-01-17
Payer: SELF-PAY

## 2018-01-17 ENCOUNTER — APPOINTMENT (OUTPATIENT)
Dept: ORTHOPEDIC SURGERY | Facility: HOSPITAL | Age: 38
End: 2018-01-17

## 2018-01-17 VITALS
DIASTOLIC BLOOD PRESSURE: 83 MMHG | BODY MASS INDEX: 26.07 KG/M2 | WEIGHT: 174 LBS | RESPIRATION RATE: 16 BRPM | SYSTOLIC BLOOD PRESSURE: 131 MMHG | HEIGHT: 68.7 IN | HEART RATE: 102 BPM

## 2018-01-17 DIAGNOSIS — M79.643 PAIN IN UNSPECIFIED HAND: ICD-10-CM

## 2018-01-17 DIAGNOSIS — M79.89 OTHER SPECIFIED SOFT TISSUE DISORDERS: ICD-10-CM

## 2018-01-17 DIAGNOSIS — A04.72 ENTEROCOLITIS DUE TO CLOSTRIDIUM DIFFICILE, NOT SPECIFIED AS RECURRENT: ICD-10-CM

## 2018-01-17 DIAGNOSIS — Z86.19 PERSONAL HISTORY OF OTHER INFECTIOUS AND PARASITIC DISEASES: ICD-10-CM

## 2018-01-17 DIAGNOSIS — M25.541 PAIN IN JOINTS OF RIGHT HAND: ICD-10-CM

## 2018-01-17 DIAGNOSIS — Z98.890 OTHER SPECIFIED POSTPROCEDURAL STATES: Chronic | ICD-10-CM

## 2018-01-17 DIAGNOSIS — K92.1 MELENA: ICD-10-CM

## 2018-01-17 DIAGNOSIS — A04.8 OTHER SPECIFIED BACTERIAL INTESTINAL INFECTIONS: ICD-10-CM

## 2018-01-17 DIAGNOSIS — R19.7 DIARRHEA, UNSPECIFIED: ICD-10-CM

## 2018-01-17 PROCEDURE — G0463: CPT

## 2018-01-17 PROCEDURE — 73130 X-RAY EXAM OF HAND: CPT | Mod: 26,RT

## 2018-01-17 PROCEDURE — 73130 X-RAY EXAM OF HAND: CPT

## 2018-02-27 ENCOUNTER — OUTPATIENT (OUTPATIENT)
Dept: OUTPATIENT SERVICES | Facility: HOSPITAL | Age: 38
LOS: 1 days | End: 2018-02-27
Payer: SELF-PAY

## 2018-02-27 ENCOUNTER — APPOINTMENT (OUTPATIENT)
Dept: GASTROENTEROLOGY | Facility: HOSPITAL | Age: 38
End: 2018-02-27

## 2018-02-27 VITALS
BODY MASS INDEX: 27.27 KG/M2 | WEIGHT: 182 LBS | HEART RATE: 87 BPM | SYSTOLIC BLOOD PRESSURE: 126 MMHG | DIASTOLIC BLOOD PRESSURE: 80 MMHG | HEIGHT: 68.5 IN

## 2018-02-27 DIAGNOSIS — K31.9 DISEASE OF STOMACH AND DUODENUM, UNSPECIFIED: ICD-10-CM

## 2018-02-27 DIAGNOSIS — A04.72 ENTEROCOLITIS DUE TO CLOSTRIDIUM DIFFICILE, NOT SPECIFIED AS RECURRENT: ICD-10-CM

## 2018-02-27 DIAGNOSIS — A04.8 OTHER SPECIFIED BACTERIAL INTESTINAL INFECTIONS: ICD-10-CM

## 2018-02-27 DIAGNOSIS — R10.9 UNSPECIFIED ABDOMINAL PAIN: ICD-10-CM

## 2018-02-27 DIAGNOSIS — Z98.890 OTHER SPECIFIED POSTPROCEDURAL STATES: Chronic | ICD-10-CM

## 2018-02-27 LAB
ALBUMIN SERPL ELPH-MCNC: 5 G/DL — SIGNIFICANT CHANGE UP (ref 3.3–5)
ALP SERPL-CCNC: 74 U/L — SIGNIFICANT CHANGE UP (ref 40–120)
ALT FLD-CCNC: 146 U/L — HIGH (ref 10–45)
ANION GAP SERPL CALC-SCNC: 13 MMOL/L — SIGNIFICANT CHANGE UP (ref 5–17)
AST SERPL-CCNC: 65 U/L — HIGH (ref 10–40)
BILIRUB SERPL-MCNC: 0.6 MG/DL — SIGNIFICANT CHANGE UP (ref 0.2–1.2)
BUN SERPL-MCNC: 11 MG/DL — SIGNIFICANT CHANGE UP (ref 7–23)
CALCIUM SERPL-MCNC: 9.7 MG/DL — SIGNIFICANT CHANGE UP (ref 8.4–10.5)
CHLORIDE SERPL-SCNC: 100 MMOL/L — SIGNIFICANT CHANGE UP (ref 96–108)
CO2 SERPL-SCNC: 27 MMOL/L — SIGNIFICANT CHANGE UP (ref 22–31)
CREAT SERPL-MCNC: 0.9 MG/DL — SIGNIFICANT CHANGE UP (ref 0.5–1.3)
GLUCOSE SERPL-MCNC: 89 MG/DL — SIGNIFICANT CHANGE UP (ref 70–99)
POTASSIUM SERPL-MCNC: 4.2 MMOL/L — SIGNIFICANT CHANGE UP (ref 3.5–5.3)
POTASSIUM SERPL-SCNC: 4.2 MMOL/L — SIGNIFICANT CHANGE UP (ref 3.5–5.3)
PROT SERPL-MCNC: 8.2 G/DL — SIGNIFICANT CHANGE UP (ref 6–8.3)
SODIUM SERPL-SCNC: 140 MMOL/L — SIGNIFICANT CHANGE UP (ref 135–145)

## 2018-02-27 PROCEDURE — G0463: CPT

## 2018-02-27 PROCEDURE — 80053 COMPREHEN METABOLIC PANEL: CPT

## 2018-02-28 ENCOUNTER — OUTPATIENT (OUTPATIENT)
Dept: OUTPATIENT SERVICES | Facility: HOSPITAL | Age: 38
LOS: 1 days | End: 2018-02-28
Payer: SELF-PAY

## 2018-02-28 ENCOUNTER — RESULT REVIEW (OUTPATIENT)
Age: 38
End: 2018-02-28

## 2018-02-28 DIAGNOSIS — Z98.890 OTHER SPECIFIED POSTPROCEDURAL STATES: Chronic | ICD-10-CM

## 2018-02-28 DIAGNOSIS — R10.9 UNSPECIFIED ABDOMINAL PAIN: ICD-10-CM

## 2018-02-28 PROCEDURE — 88312 SPECIAL STAINS GROUP 1: CPT

## 2018-02-28 PROCEDURE — 88305 TISSUE EXAM BY PATHOLOGIST: CPT

## 2018-02-28 PROCEDURE — 43239 EGD BIOPSY SINGLE/MULTIPLE: CPT

## 2018-02-28 PROCEDURE — 88312 SPECIAL STAINS GROUP 1: CPT | Mod: 26

## 2018-02-28 PROCEDURE — 43239 EGD BIOPSY SINGLE/MULTIPLE: CPT | Mod: GC

## 2018-02-28 PROCEDURE — 88305 TISSUE EXAM BY PATHOLOGIST: CPT | Mod: 26

## 2018-02-28 RX ORDER — PANTOPRAZOLE 40 MG/1
40 TABLET, DELAYED RELEASE ORAL
Qty: 30 | Refills: 1 | Status: DISCONTINUED | COMMUNITY
Start: 2018-01-10 | End: 2018-02-28

## 2018-03-02 ENCOUNTER — APPOINTMENT (OUTPATIENT)
Dept: ULTRASOUND IMAGING | Facility: CLINIC | Age: 38
End: 2018-03-02
Payer: COMMERCIAL

## 2018-03-02 ENCOUNTER — OUTPATIENT (OUTPATIENT)
Dept: OUTPATIENT SERVICES | Facility: HOSPITAL | Age: 38
LOS: 1 days | End: 2018-03-02
Payer: SELF-PAY

## 2018-03-02 DIAGNOSIS — Z98.890 OTHER SPECIFIED POSTPROCEDURAL STATES: Chronic | ICD-10-CM

## 2018-03-02 DIAGNOSIS — A04.8 OTHER SPECIFIED BACTERIAL INTESTINAL INFECTIONS: ICD-10-CM

## 2018-03-02 DIAGNOSIS — R10.9 UNSPECIFIED ABDOMINAL PAIN: ICD-10-CM

## 2018-03-02 DIAGNOSIS — A04.72 ENTEROCOLITIS DUE TO CLOSTRIDIUM DIFFICILE, NOT SPECIFIED AS RECURRENT: ICD-10-CM

## 2018-03-02 LAB — SURGICAL PATHOLOGY STUDY: SIGNIFICANT CHANGE UP

## 2018-03-02 PROCEDURE — 76700 US EXAM ABDOM COMPLETE: CPT

## 2018-03-02 PROCEDURE — 76700 US EXAM ABDOM COMPLETE: CPT | Mod: 26

## 2018-03-06 ENCOUNTER — OTHER (OUTPATIENT)
Age: 38
End: 2018-03-06

## 2018-05-08 ENCOUNTER — APPOINTMENT (OUTPATIENT)
Dept: GASTROENTEROLOGY | Facility: HOSPITAL | Age: 38
End: 2018-05-08

## 2018-11-09 ENCOUNTER — APPOINTMENT (OUTPATIENT)
Dept: INTERNAL MEDICINE | Facility: CLINIC | Age: 38
End: 2018-11-09

## 2018-11-09 ENCOUNTER — OUTPATIENT (OUTPATIENT)
Dept: OUTPATIENT SERVICES | Facility: HOSPITAL | Age: 38
LOS: 1 days | End: 2018-11-09
Payer: SELF-PAY

## 2018-11-09 VITALS
BODY MASS INDEX: 27.27 KG/M2 | WEIGHT: 182 LBS | DIASTOLIC BLOOD PRESSURE: 86 MMHG | SYSTOLIC BLOOD PRESSURE: 120 MMHG | HEIGHT: 68.5 IN

## 2018-11-09 DIAGNOSIS — I10 ESSENTIAL (PRIMARY) HYPERTENSION: ICD-10-CM

## 2018-11-09 DIAGNOSIS — Z87.898 PERSONAL HISTORY OF OTHER SPECIFIED CONDITIONS: ICD-10-CM

## 2018-11-09 DIAGNOSIS — H53.8 OTHER VISUAL DISTURBANCES: ICD-10-CM

## 2018-11-09 DIAGNOSIS — Z98.890 OTHER SPECIFIED POSTPROCEDURAL STATES: Chronic | ICD-10-CM

## 2018-11-09 DIAGNOSIS — R51 HEADACHE: ICD-10-CM

## 2018-11-09 PROCEDURE — G0463: CPT

## 2018-11-12 ENCOUNTER — OUTPATIENT (OUTPATIENT)
Dept: OUTPATIENT SERVICES | Facility: HOSPITAL | Age: 38
LOS: 1 days | End: 2018-11-12

## 2018-11-12 ENCOUNTER — APPOINTMENT (OUTPATIENT)
Dept: OPHTHALMOLOGY | Facility: CLINIC | Age: 38
End: 2018-11-12

## 2018-11-12 ENCOUNTER — FORM ENCOUNTER (OUTPATIENT)
Age: 38
End: 2018-11-12

## 2018-11-12 DIAGNOSIS — Z98.890 OTHER SPECIFIED POSTPROCEDURAL STATES: Chronic | ICD-10-CM

## 2018-11-13 ENCOUNTER — APPOINTMENT (OUTPATIENT)
Dept: CT IMAGING | Facility: CLINIC | Age: 38
End: 2018-11-13
Payer: COMMERCIAL

## 2018-11-13 ENCOUNTER — OUTPATIENT (OUTPATIENT)
Dept: OUTPATIENT SERVICES | Facility: HOSPITAL | Age: 38
LOS: 1 days | End: 2018-11-13
Payer: SELF-PAY

## 2018-11-13 DIAGNOSIS — Z98.890 OTHER SPECIFIED POSTPROCEDURAL STATES: Chronic | ICD-10-CM

## 2018-11-13 DIAGNOSIS — I10 ESSENTIAL (PRIMARY) HYPERTENSION: ICD-10-CM

## 2018-11-13 DIAGNOSIS — H57.10 OCULAR PAIN, UNSPECIFIED EYE: ICD-10-CM

## 2018-11-13 PROCEDURE — 70450 CT HEAD/BRAIN W/O DYE: CPT

## 2018-11-13 PROCEDURE — 70450 CT HEAD/BRAIN W/O DYE: CPT | Mod: 26

## 2018-12-04 ENCOUNTER — APPOINTMENT (OUTPATIENT)
Dept: INTERNAL MEDICINE | Facility: CLINIC | Age: 38
End: 2018-12-04

## 2019-02-04 ENCOUNTER — APPOINTMENT (OUTPATIENT)
Dept: OPHTHALMOLOGY | Facility: CLINIC | Age: 39
End: 2019-02-04

## 2019-02-09 ENCOUNTER — EMERGENCY (EMERGENCY)
Facility: HOSPITAL | Age: 39
LOS: 1 days | Discharge: HOME | End: 2019-02-09
Attending: EMERGENCY MEDICINE
Payer: MEDICAID

## 2019-02-09 VITALS
HEIGHT: 68 IN | WEIGHT: 199.96 LBS | DIASTOLIC BLOOD PRESSURE: 84 MMHG | SYSTOLIC BLOOD PRESSURE: 123 MMHG | HEART RATE: 93 BPM | RESPIRATION RATE: 18 BRPM | OXYGEN SATURATION: 98 % | TEMPERATURE: 98 F

## 2019-02-09 VITALS
SYSTOLIC BLOOD PRESSURE: 133 MMHG | OXYGEN SATURATION: 99 % | HEART RATE: 91 BPM | DIASTOLIC BLOOD PRESSURE: 86 MMHG | TEMPERATURE: 98 F | RESPIRATION RATE: 17 BRPM

## 2019-02-09 DIAGNOSIS — Z98.890 OTHER SPECIFIED POSTPROCEDURAL STATES: Chronic | ICD-10-CM

## 2019-02-09 LAB
ALBUMIN SERPL ELPH-MCNC: 4.3 G/DL — SIGNIFICANT CHANGE UP (ref 3.3–5)
ALP SERPL-CCNC: 86 U/L — SIGNIFICANT CHANGE UP (ref 40–120)
ALT FLD-CCNC: 34 U/L — SIGNIFICANT CHANGE UP (ref 10–45)
ANION GAP SERPL CALC-SCNC: 13 MMOL/L — SIGNIFICANT CHANGE UP (ref 5–17)
APPEARANCE UR: CLEAR — SIGNIFICANT CHANGE UP
AST SERPL-CCNC: 21 U/L — SIGNIFICANT CHANGE UP (ref 10–40)
BACTERIA # UR AUTO: NEGATIVE — SIGNIFICANT CHANGE UP
BASOPHILS # BLD AUTO: 0 K/UL — SIGNIFICANT CHANGE UP (ref 0–0.2)
BASOPHILS NFR BLD AUTO: 0.3 % — SIGNIFICANT CHANGE UP (ref 0–2)
BILIRUB SERPL-MCNC: 0.4 MG/DL — SIGNIFICANT CHANGE UP (ref 0.2–1.2)
BILIRUB UR-MCNC: NEGATIVE — SIGNIFICANT CHANGE UP
BUN SERPL-MCNC: 16 MG/DL — SIGNIFICANT CHANGE UP (ref 7–23)
CALCIUM SERPL-MCNC: 9.5 MG/DL — SIGNIFICANT CHANGE UP (ref 8.4–10.5)
CHLORIDE SERPL-SCNC: 102 MMOL/L — SIGNIFICANT CHANGE UP (ref 96–108)
CO2 SERPL-SCNC: 25 MMOL/L — SIGNIFICANT CHANGE UP (ref 22–31)
COLOR SPEC: YELLOW — SIGNIFICANT CHANGE UP
CREAT SERPL-MCNC: 1.06 MG/DL — SIGNIFICANT CHANGE UP (ref 0.5–1.3)
DIFF PNL FLD: NEGATIVE — SIGNIFICANT CHANGE UP
EOSINOPHIL # BLD AUTO: 0.1 K/UL — SIGNIFICANT CHANGE UP (ref 0–0.5)
EOSINOPHIL NFR BLD AUTO: 0.9 % — SIGNIFICANT CHANGE UP (ref 0–6)
EPI CELLS # UR: 4 /HPF — SIGNIFICANT CHANGE UP
GLUCOSE SERPL-MCNC: 99 MG/DL — SIGNIFICANT CHANGE UP (ref 70–99)
GLUCOSE UR QL: NEGATIVE — SIGNIFICANT CHANGE UP
HCT VFR BLD CALC: 42.7 % — SIGNIFICANT CHANGE UP (ref 39–50)
HGB BLD-MCNC: 15.1 G/DL — SIGNIFICANT CHANGE UP (ref 13–17)
HYALINE CASTS # UR AUTO: 1 /LPF — SIGNIFICANT CHANGE UP (ref 0–2)
KETONES UR-MCNC: NEGATIVE — SIGNIFICANT CHANGE UP
LEUKOCYTE ESTERASE UR-ACNC: NEGATIVE — SIGNIFICANT CHANGE UP
LYMPHOCYTES # BLD AUTO: 2.2 K/UL — SIGNIFICANT CHANGE UP (ref 1–3.3)
LYMPHOCYTES # BLD AUTO: 22.3 % — SIGNIFICANT CHANGE UP (ref 13–44)
MCHC RBC-ENTMCNC: 30.5 PG — SIGNIFICANT CHANGE UP (ref 27–34)
MCHC RBC-ENTMCNC: 35.4 GM/DL — SIGNIFICANT CHANGE UP (ref 32–36)
MCV RBC AUTO: 86 FL — SIGNIFICANT CHANGE UP (ref 80–100)
MONOCYTES # BLD AUTO: 1.1 K/UL — HIGH (ref 0–0.9)
MONOCYTES NFR BLD AUTO: 10.7 % — SIGNIFICANT CHANGE UP (ref 2–14)
NEUTROPHILS # BLD AUTO: 6.5 K/UL — SIGNIFICANT CHANGE UP (ref 1.8–7.4)
NEUTROPHILS NFR BLD AUTO: 65.9 % — SIGNIFICANT CHANGE UP (ref 43–77)
NITRITE UR-MCNC: NEGATIVE — SIGNIFICANT CHANGE UP
PH UR: 6 — SIGNIFICANT CHANGE UP (ref 5–8)
PLATELET # BLD AUTO: 134 K/UL — LOW (ref 150–400)
POTASSIUM SERPL-MCNC: 4.6 MMOL/L — SIGNIFICANT CHANGE UP (ref 3.5–5.3)
POTASSIUM SERPL-SCNC: 4.6 MMOL/L — SIGNIFICANT CHANGE UP (ref 3.5–5.3)
PROT SERPL-MCNC: 7.7 G/DL — SIGNIFICANT CHANGE UP (ref 6–8.3)
PROT UR-MCNC: ABNORMAL
RBC # BLD: 4.96 M/UL — SIGNIFICANT CHANGE UP (ref 4.2–5.8)
RBC # FLD: 11.4 % — SIGNIFICANT CHANGE UP (ref 10.3–14.5)
RBC CASTS # UR COMP ASSIST: 1 /HPF — SIGNIFICANT CHANGE UP (ref 0–4)
SODIUM SERPL-SCNC: 140 MMOL/L — SIGNIFICANT CHANGE UP (ref 135–145)
SP GR SPEC: 1.03 — HIGH (ref 1.01–1.02)
UROBILINOGEN FLD QL: NEGATIVE — SIGNIFICANT CHANGE UP
WBC # BLD: 9.9 K/UL — SIGNIFICANT CHANGE UP (ref 3.8–10.5)
WBC # FLD AUTO: 9.9 K/UL — SIGNIFICANT CHANGE UP (ref 3.8–10.5)
WBC UR QL: 1 /HPF — SIGNIFICANT CHANGE UP (ref 0–5)

## 2019-02-09 PROCEDURE — 80053 COMPREHEN METABOLIC PANEL: CPT

## 2019-02-09 PROCEDURE — 81001 URINALYSIS AUTO W/SCOPE: CPT

## 2019-02-09 PROCEDURE — 99283 EMERGENCY DEPT VISIT LOW MDM: CPT

## 2019-02-09 PROCEDURE — 85027 COMPLETE CBC AUTOMATED: CPT

## 2019-02-09 PROCEDURE — 99284 EMERGENCY DEPT VISIT MOD MDM: CPT

## 2019-02-09 NOTE — ED ADULT NURSE NOTE - NSIMPLEMENTINTERV_GEN_ALL_ED
Implemented All Universal Safety Interventions:  [place] to call system. Call bell, personal items and telephone within reach. Instruct patient to call for assistance. Room bathroom lighting operational. Non-slip footwear when patient is off stretcher. Physically safe environment: no spills, clutter or unnecessary equipment. Stretcher in lowest position, wheels locked, appropriate side rails in place.

## 2019-02-09 NOTE — ED ADULT NURSE REASSESSMENT NOTE - NS ED NURSE REASSESS COMMENT FT1
patient is resting in bed in no acute distress. patient denies pain at this time. waiting for MD to reassess patient.

## 2019-02-09 NOTE — ED PROVIDER NOTE - NSFOLLOWUPINSTRUCTIONS_ED_ALL_ED_FT
Please return to the [facility] ED in 48 hours (on Monday) for a repeat wound check.  Please take your prescribed DOXYCYCLINE for your infection.    Cellulitis    WHAT YOU NEED TO KNOW:    Cellulitis is a skin infection caused by bacteria. Cellulitis may go away on its own or you may need treatment. Your healthcare provider may draw a [place] around the outside edges of your cellulitis. If your cellulitis spreads, your healthcare provider will see it outside of the [place]. Cellulitis          DISCHARGE INSTRUCTIONS:    Call 911 if:     You have sudden trouble breathing or chest pain.        Return to the emergency department if:     Your wound gets larger and more painful.       You feel a crackling under your skin when you touch it.      You have purple dots or bumps on your skin, or you see bleeding under your skin.      You have new swelling and pain in your legs.      The red, warm, swollen area gets larger.      You see red streaks coming from the infected area.    Contact your healthcare provider if:     You have a fever.      Your fever or pain does not go away or gets worse.      The area does not get smaller after 2 days of antibiotics.      Your skin is flaking or peeling off.      You have questions or concerns about your condition or care.    Medicines:     Antibiotics help treat the bacterial infection.       NSAIDs, such as ibuprofen, help decrease swelling, pain, and fever. NSAIDs can cause stomach bleeding or kidney problems in certain people. If you take blood thinner medicine, always ask if NSAIDs are safe for you. Always read the medicine label and follow directions. Do not give these medicines to children under 6 months of age without direction from your child's healthcare provider.      Acetaminophen decreases pain and fever. It is available without a doctor's order. Ask how much to take and how often to take it. Follow directions. Read the labels of all other medicines you are using to see if they also contain acetaminophen, or ask your doctor or pharmacist. Acetaminophen can cause liver damage if not taken correctly. Do not use more than 4 grams (4,000 milligrams) total of acetaminophen in one day.       Take your medicine as directed. Contact your healthcare provider if you think your medicine is not helping or if you have side effects. Tell him or her if you are allergic to any medicine. Keep a list of the medicines, vitamins, and herbs you take. Include the amounts, and when and why you take them. Bring the list or the pill bottles to follow-up visits. Carry your medicine list with you in case of an emergency.    Self-care:     Elevate the area above the level of your heart as often as you can. This will help decrease swelling and pain. Prop the area on pillows or blankets to keep it elevated comfortably.       Clean the area daily until the wound scabs over. Gently wash the area with soap and water. Pat dry. Use dressings as directed.       Place cool or warm, wet cloths on the area as directed. Use clean cloths and clean water. Leave it on the area until the cloth is room temperature. Pat the area dry with a clean, dry cloth. The cloths may help decrease pain.     Prevent cellulitis:     Do not scratch bug bites or areas of injury. You increase your risk for cellulitis by scratching these areas.       Do not share personal items, such as towels, clothing, and razors.       Clean exercise equipment with germ-killing  before and after you use it.      Wash your hands often. Use soap and water. Wash your hands after you use the bathroom, change a child's diapers, or sneeze. Wash your hands before you prepare or eat food. Use lotion to prevent dry, cracked skin. Handwashing           Wear pressure stockings as directed. You may be told to wear the stockings if you have peripheral edema. The stockings improve blood flow and decrease swelling.      Treat athlete’s foot. This can help prevent the spread of a bacterial skin infection.    Follow up with your healthcare provider within 3 days, or as directed: Your healthcare provider will check if your cellulitis is getting better. You may need different medicine. Write down your questions so you remember to ask them during your visits.

## 2019-02-09 NOTE — ED PROVIDER NOTE - OBJECTIVE STATEMENT
38M w/out pmh p/w LLE pain and erythema x 3 days, worsening, also w/ mild swelling in groin. No fever, n/v/d/c. Has myalgias in joints throughout bilat UE and LE. No recent travel, medication change, illness, or hospitalization. Had similar sx 1-2 months ago.

## 2019-02-09 NOTE — ED PROVIDER NOTE - MEDICAL DECISION MAKING DETAILS
38M w/ LLE cellulitis, no abscess on exam, not concerning for joint involvement; will check labs, reassess

## 2019-02-09 NOTE — ED ADULT NURSE NOTE - OBJECTIVE STATEMENT
37 yo male presents to the ED from home c/o left knee pain, swelling redness x4days with fever last night of 100.8. patient states he "thinks a spider bit me" after he was raking leaves in the yard. patient is AAOx3. lung sounds clear. cap refill <3 sec. patient left knee is swollen, red and warm to touch. patient is AAOx3. lung sounds clear. cap refill <3sec. patient denies HA, dizziness, cough, abdominal pain, back pain, dysuria, hematuria, chills, N/V/D, numbness or tingling. patient states he has pain in "all of his joints." VSS MD at the bedside.

## 2019-02-09 NOTE — ED PROVIDER NOTE - PHYSICAL EXAMINATION
*GEN:   comfortable, in no acute distress, AOx3  *EYES:   pupils equally round and reactive to light, extra-occular movements intact  *HEENT:   airway patent, moist mucosal membranes, full ROM neck  *CV:   regular rate and rhythm  *RESP:   clear to auscultation bilaterally, non-labored  *ABD:   soft, non-tender  *:   no cva/flank tenderness; small L inguinal lymphadenopathy  *MSK:   mild erythema and minimal tenderness extending down from L lateral/inferior knee; full ROM knee w/out difficulty; no other MSK issues  *SKIN:   dry, intact  *NEURO:   AOx3, no focal weakness or loss of sensation, gait normal

## 2019-02-09 NOTE — ED PROVIDER NOTE - ATTENDING CONTRIBUTION TO CARE
****ATTENDING**** 39yo male presents with L knee redness x 3-4 days. States he did some gardening not sure if he was bit by something now w redness, pain and swelling. Denies any knee pain. + Fever yesterday 101. Denies any recent cough, uri, abd pain, dysuria or discharge. No recent travel or tick exposure.  On exam, Patient is awake,alert,oriented x 3. Patient is well appearing and in no acute distress. Patient's chest is clear to ausculation, +s1s2. Abdomen is soft nd/nt +BS. Extremity with no swelling or calf tenderness. L knee below 4x4 cm area of erythema and warmth. Knee non tender + ROM at joint. No other joint swelling.   Check labs, UA, treat for cellulitis with no joint involvement and return for wound check in 48hrs.

## 2019-02-25 DIAGNOSIS — H11.059: ICD-10-CM

## 2019-03-12 ENCOUNTER — LABORATORY RESULT (OUTPATIENT)
Age: 39
End: 2019-03-12

## 2019-03-12 ENCOUNTER — OUTPATIENT (OUTPATIENT)
Dept: OUTPATIENT SERVICES | Facility: HOSPITAL | Age: 39
LOS: 1 days | End: 2019-03-12
Payer: SELF-PAY

## 2019-03-12 ENCOUNTER — APPOINTMENT (OUTPATIENT)
Dept: INTERNAL MEDICINE | Facility: CLINIC | Age: 39
End: 2019-03-12

## 2019-03-12 VITALS
HEART RATE: 85 BPM | DIASTOLIC BLOOD PRESSURE: 80 MMHG | OXYGEN SATURATION: 96 % | SYSTOLIC BLOOD PRESSURE: 120 MMHG | WEIGHT: 203 LBS | HEIGHT: 68.5 IN | BODY MASS INDEX: 30.41 KG/M2

## 2019-03-12 VITALS — TEMPERATURE: 99.2 F

## 2019-03-12 DIAGNOSIS — Z86.69 PERSONAL HISTORY OF OTHER DISEASES OF THE NERVOUS SYSTEM AND SENSE ORGANS: ICD-10-CM

## 2019-03-12 DIAGNOSIS — R63.0 ANOREXIA: ICD-10-CM

## 2019-03-12 DIAGNOSIS — I10 ESSENTIAL (PRIMARY) HYPERTENSION: ICD-10-CM

## 2019-03-12 DIAGNOSIS — M79.89 OTHER SPECIFIED SOFT TISSUE DISORDERS: ICD-10-CM

## 2019-03-12 DIAGNOSIS — Z98.890 OTHER SPECIFIED POSTPROCEDURAL STATES: Chronic | ICD-10-CM

## 2019-03-12 DIAGNOSIS — L03.90 CELLULITIS, UNSPECIFIED: ICD-10-CM

## 2019-03-12 DIAGNOSIS — Z86.19 PERSONAL HISTORY OF OTHER INFECTIOUS AND PARASITIC DISEASES: ICD-10-CM

## 2019-03-12 DIAGNOSIS — S01.01XA LACERATION W/OUT FOREIGN BODY OF SCALP, INITIAL ENCOUNTER: ICD-10-CM

## 2019-03-12 DIAGNOSIS — Z87.898 PERSONAL HISTORY OF OTHER SPECIFIED CONDITIONS: ICD-10-CM

## 2019-03-12 DIAGNOSIS — Z87.19 PERSONAL HISTORY OF OTHER DISEASES OF THE DIGESTIVE SYSTEM: ICD-10-CM

## 2019-03-12 DIAGNOSIS — L02.31 CUTANEOUS ABSCESS OF BUTTOCK: ICD-10-CM

## 2019-03-12 DIAGNOSIS — L08.9 LOCAL INFECTION OF THE SKIN AND SUBCUTANEOUS TISSUE, UNSPECIFIED: ICD-10-CM

## 2019-03-12 DIAGNOSIS — B95.62 CELLULITIS, UNSPECIFIED: ICD-10-CM

## 2019-03-12 DIAGNOSIS — M25.569 PAIN IN UNSPECIFIED KNEE: ICD-10-CM

## 2019-03-12 DIAGNOSIS — Z86.79 PERSONAL HISTORY OF OTHER DISEASES OF THE CIRCULATORY SYSTEM: ICD-10-CM

## 2019-03-12 DIAGNOSIS — M25.473 EFFUSION, UNSPECIFIED ANKLE: ICD-10-CM

## 2019-03-12 DIAGNOSIS — K92.1 MELENA: ICD-10-CM

## 2019-03-12 DIAGNOSIS — M25.541 PAIN IN JOINTS OF RIGHT HAND: ICD-10-CM

## 2019-03-12 PROCEDURE — G0463: CPT

## 2019-03-12 RX ORDER — OMEPRAZOLE 20 MG/1
20 CAPSULE, DELAYED RELEASE ORAL DAILY
Qty: 90 | Refills: 2 | Status: DISCONTINUED | COMMUNITY
Start: 2018-02-28 | End: 2019-03-12

## 2019-03-12 RX ORDER — AMOXICILLIN 500 MG/1
500 TABLET, FILM COATED ORAL TWICE DAILY
Qty: 56 | Refills: 0 | Status: DISCONTINUED | COMMUNITY
Start: 2018-03-06 | End: 2019-03-12

## 2019-03-12 RX ORDER — CLARITHROMYCIN 500 MG/1
500 TABLET, FILM COATED ORAL TWICE DAILY
Qty: 28 | Refills: 0 | Status: DISCONTINUED | COMMUNITY
Start: 2018-03-06 | End: 2019-03-12

## 2019-03-12 RX ORDER — METRONIDAZOLE 500 MG/1
500 TABLET ORAL
Qty: 30 | Refills: 0 | Status: DISCONTINUED | COMMUNITY
Start: 2018-01-16 | End: 2019-03-12

## 2019-03-12 RX ORDER — OMEPRAZOLE 20 MG/1
20 TABLET, DELAYED RELEASE ORAL
Qty: 28 | Refills: 0 | Status: DISCONTINUED | COMMUNITY
Start: 2018-03-06 | End: 2019-03-12

## 2019-03-13 DIAGNOSIS — N50.819 TESTICULAR PAIN, UNSPECIFIED: ICD-10-CM

## 2019-03-13 DIAGNOSIS — N45.3 EPIDIDYMO-ORCHITIS: ICD-10-CM

## 2019-03-13 LAB
BILIRUB UR QL STRIP: NORMAL
CLARITY UR: CLEAR
COLLECTION METHOD: NORMAL
GLUCOSE UR-MCNC: NORMAL
HCG UR QL: 0.2 EU/DL
HGB UR QL STRIP.AUTO: NORMAL
KETONES UR-MCNC: NORMAL
LEUKOCYTE ESTERASE UR QL STRIP: NORMAL
NITRITE UR QL STRIP: NORMAL
PH UR STRIP: 6
PROT UR STRIP-MCNC: NORMAL
SP GR UR STRIP: 1.02

## 2019-03-13 NOTE — REVIEW OF SYSTEMS
[Dysuria] : dysuria [Frequency] : frequency [Fever] : no fever [Chills] : no chills [Night Sweats] : no night sweats [Discharge] : no discharge [Pain] : no pain [Vision Problems] : no vision problems [Itching] : no itching [Earache] : no earache [Hearing Loss] : no hearing loss [Nasal Discharge] : no nasal discharge [Sore Throat] : no sore throat [Chest Pain] : no chest pain [Palpitations] : no palpitations [Orthopena] : no orthopnea [Paroysmal Nocturnal Dyspnea] : no paroysmal nocturnal dyspnea [Shortness Of Breath] : no shortness of breath [Wheezing] : no wheezing [Abdominal Pain] : no abdominal pain [Nausea] : no nausea [Constipation] : no constipation [Vomiting] : no vomiting [Heartburn] : no heartburn [Melena] : no melena [Incontinence] : no incontinence [Hesitancy] : no hesitancy [Nocturia] : no nocturia [Hematuria] : no hematuria [Joint Pain] : no joint pain [Joint Stiffness] : no joint stiffness [Muscle Pain] : no muscle pain [Back Pain] : no back pain [Joint Swelling] : no joint swelling [Itching] : no itching [Mole Changes] : no mole changes [Nail Changes] : no nail changes [Skin Rash] : no skin rash [Headache] : no headache [Dizziness] : no dizziness [Fainting] : no fainting [Unsteady Walk] : no ataxia [Memory Loss] : no memory loss [Suicidal] : not suicidal [Insomnia] : no insomnia [Anxiety] : no anxiety [Easy Bleeding] : no easy bleeding [Easy Bruising] : no easy bruising [Swollen Glands] : no swollen glands

## 2019-03-13 NOTE — PHYSICAL EXAM
[No Acute Distress] : no acute distress [Well Nourished] : well nourished [Well Developed] : well developed [No JVD] : no jugular venous distention [Supple] : supple [No Lymphadenopathy] : no lymphadenopathy [No Respiratory Distress] : no respiratory distress  [Clear to Auscultation] : lungs were clear to auscultation bilaterally [Normal Rate] : normal rate  [Regular Rhythm] : with a regular rhythm [Normal S1, S2] : normal S1 and S2 [No Carotid Bruits] : no carotid bruits [No Abdominal Bruit] : a ~M bruit was not heard ~T in the abdomen [No Edema] : there was no peripheral edema [No Extremity Clubbing/Cyanosis] : no extremity clubbing/cyanosis [Soft] : abdomen soft [Non Tender] : non-tender [Non-distended] : non-distended [No HSM] : no HSM [Normal Bowel Sounds] : normal bowel sounds [Prostate Tenderness] : was tender [Normal Posterior Cervical Nodes] : no posterior cervical lymphadenopathy [Normal Anterior Cervical Nodes] : no anterior cervical lymphadenopathy [No CVA Tenderness] : no CVA  tenderness [No Spinal Tenderness] : no spinal tenderness [No Joint Swelling] : no joint swelling [Grossly Normal Strength/Tone] : grossly normal strength/tone [No Rash] : no rash [Normal Gait] : normal gait [Coordination Grossly Intact] : coordination grossly intact [No Focal Deficits] : no focal deficits [Fistula] : no fistulas [Wart] : no warts [FreeTextEntry1] : 1mm raised skin color lesion on anterior penile shaft, +left testicular/epididymal tenderness.

## 2019-03-13 NOTE — ASSESSMENT
[FreeTextEntry1] : 38M hx UTI, prostatitis, c.diff colitis, presenting to the clinic w/ left testicular pain x5 days.

## 2019-03-13 NOTE — PLAN
[FreeTextEntry1] : #testicular pain - prostatitis vs epididymitis vs UTI\par TTP testes and epydimidal region also slight TTP on prostate exam however not boggy nor nodules felt. \par -will check urine GC/chlamydia; UA in office negative however will send urine for UA in labs, check Urine culture. -will start levofloxacin 500mg qd for 10 days as tx for epididymitis.\par -given patient w/ recurrent urogenital infections, will refer to urology for further evaluation.

## 2019-04-05 PROBLEM — Z87.898 HISTORY OF HEADACHE: Status: RESOLVED | Noted: 2018-11-09 | Resolved: 2019-04-05

## 2019-04-05 NOTE — HISTORY OF PRESENT ILLNESS
[[employer] ] : provided by [employer]   [FreeTextEntry8] : 38 y.o. Israeli-speaking M with history of MRSA abscess/cellulitis, H. pylori gastritis s/p treatment, C. diff colitis following antibiotic use (2018) presenting for an acute visit. \par \par Patient reports eye pain and headache for past two weeks. States head pain is located along his scalp on the left side extending posteriorly and is sharp in nature, 7/10 in severity. It is associated with pressure-like eye pain that he has had since the head pain started. For the past 3 days, has had blurry vision in his left eye. He reports nausea, photophobia and imbalance, but denies lacrimation, rash, recent fever, chills, weight loss, URI symptoms, vomiting or recent falls. He denies recent travel. Has no history of migraine, autoimmune, or neurologic disease and is not taking any new medications. \par \par Yesterday, patient developed chest pain that was squeezing in quality and lasted for the rest of the day (total of about 6 hours). Intensity was 7/10. Has never had pain like this before. Was at work when pain started but was not exerting himself. Woke up this morning with only mild pain. States nothing makes it worse. \par    [FreeTextEntry1] : [id number] [FreeTextEntry2] : Supa

## 2019-04-05 NOTE — PHYSICAL EXAM
[No Acute Distress] : no acute distress [Ill-Appearing] : ill-appearing [PERRL] : pupils equal round and reactive to light [EOMI] : extraocular movements intact [Fundoscopic Exam Performed] : fundoscopic ~T exam ~C was performed [Normal Outer Ear/Nose] : the outer ears and nose were normal in appearance [Normal Oropharynx] : the oropharynx was normal [Supple] : supple [No Lymphadenopathy] : no lymphadenopathy [Clear to Auscultation] : lungs were clear to auscultation bilaterally [Normal Rate] : normal rate  [Regular Rhythm] : with a regular rhythm [Normal S1, S2] : normal S1 and S2 [No Carotid Bruits] : no carotid bruits [Pedal Pulses Present] : the pedal pulses are present [No Spinal Tenderness] : no spinal tenderness [No Joint Swelling] : no joint swelling [No Rash] : no rash [No Skin Lesions] : no skin lesions [Normal Gait] : normal gait [Coordination Grossly Intact] : coordination grossly intact [No Focal Deficits] : no focal deficits [Normal Affect] : the affect was normal [Alert and Oriented x3] : oriented to person, place, and time [Normal Mood] : the mood was normal [de-identified] : Bilateral conjunctival injection; no eye discharge; +discomfort with pupillary light exam [de-identified] : Tenderness to palpation of scalp along temporal and occipital regions

## 2019-04-05 NOTE — REVIEW OF SYSTEMS
[Pain] : pain [Vision Problems] : vision problems [Chest Pain] : chest pain [Nausea] : nausea [Headache] : headache [Unsteady Walk] : ataxia [Fever] : no fever [Chills] : no chills [Recent Change In Weight] : ~T no recent weight change [Discharge] : no discharge [Itching] : no itching [Earache] : no earache [Hearing Loss] : no hearing loss [Nosebleeds] : no nosebleeds [Nasal Discharge] : no nasal discharge [Sore Throat] : no sore throat [Hoarseness] : no hoarseness [Palpitations] : no palpitations [Lower Ext Edema] : no lower extremity edema [Orthopena] : no orthopnea [Paroysmal Nocturnal Dyspnea] : no paroysmal nocturnal dyspnea [Shortness Of Breath] : no shortness of breath [Wheezing] : no wheezing [Cough] : no cough [Dyspnea on Exertion] : not dyspnea on exertion [Abdominal Pain] : no abdominal pain [Diarrhea] : no diarrhea [Vomiting] : no vomiting [Melena] : no melena [Dysuria] : no dysuria [Hematuria] : no hematuria [Joint Pain] : no joint pain [Joint Swelling] : no joint swelling [Skin Rash] : no skin rash [Dizziness] : no dizziness [Easy Bleeding] : no easy bleeding [Easy Bruising] : no easy bruising [Swollen Glands] : no swollen glands

## 2019-04-15 ENCOUNTER — OUTPATIENT (OUTPATIENT)
Dept: OUTPATIENT SERVICES | Facility: HOSPITAL | Age: 39
LOS: 1 days | End: 2019-04-15
Payer: SELF-PAY

## 2019-04-15 ENCOUNTER — APPOINTMENT (OUTPATIENT)
Dept: UROLOGY | Facility: HOSPITAL | Age: 39
End: 2019-04-15

## 2019-04-15 VITALS
DIASTOLIC BLOOD PRESSURE: 80 MMHG | SYSTOLIC BLOOD PRESSURE: 118 MMHG | WEIGHT: 203 LBS | HEART RATE: 73 BPM | HEIGHT: 68.5 IN | BODY MASS INDEX: 30.41 KG/M2 | RESPIRATION RATE: 14 BRPM

## 2019-04-15 DIAGNOSIS — N50.82 SCROTAL PAIN: ICD-10-CM

## 2019-04-15 DIAGNOSIS — Z98.890 OTHER SPECIFIED POSTPROCEDURAL STATES: Chronic | ICD-10-CM

## 2019-04-15 DIAGNOSIS — R30.0 DYSURIA: ICD-10-CM

## 2019-04-15 RX ORDER — LEVOFLOXACIN 500 MG/1
500 TABLET, FILM COATED ORAL DAILY
Qty: 10 | Refills: 0 | Status: DISCONTINUED | COMMUNITY
Start: 2019-03-12 | End: 2019-04-15

## 2019-04-15 NOTE — REVIEW OF SYSTEMS
[Testicular Pain] : testicular pain [Chills] : no chills [Fever] : no fever [Chest Pain] : no chest pain [Shortness Of Breath] : no shortness of breath [Abdominal Pain] : no abdominal pain [Dysuria] : no dysuria

## 2019-04-15 NOTE — PHYSICAL EXAM
[Normal Appearance] : normal appearance [Well Groomed] : well groomed [General Appearance - In No Acute Distress] : no acute distress [] : no respiratory distress [Abdomen Soft] : soft [Abdomen Tenderness] : non-tender [Costovertebral Angle Tenderness] : no ~M costovertebral angle tenderness [Urethral Meatus] : meatus normal [Penis Abnormality] : normal circumcised penis [Urinary Bladder Findings] : the bladder was normal on palpation [Epididymis] : the epididymides were normal [Testes Tenderness] : no tenderness of the testes [Oriented To Time, Place, And Person] : oriented to person, place, and time [FreeTextEntry1] : +left varicocele with ttp, no ttp of testicles bilaterally.

## 2019-04-15 NOTE — HISTORY OF PRESENT ILLNESS
[FreeTextEntry1] : 40yo male referred to uro clinic for b/l scrotal pain. Pt states pain started about 4 months ago and is pretty constant. Tylenol provides mild relief. Pain is worse with sitting. work up by PMD included urine cx, gc/chlamydia and abdominal US-- all negative for any  abnormalities. Pt denies any fever/chills, abdominal/flank pain, difficulty urinating, dysuria, frequency, hematuria. Recalls an episode of hematuria many years ago that resolved with antibiotics. Pt was also on abx recently for a skin infection but did not notice an improvement in pain while on the abx.

## 2019-04-15 NOTE — ASSESSMENT
[FreeTextEntry1] : 38yo male with b/l scrotal pain, likely 2/2 varicoceles\par - f/u scrotal US\par - ibuprofen and scrotal support for pain control\par - RTC in 1-2 months

## 2019-04-16 ENCOUNTER — FORM ENCOUNTER (OUTPATIENT)
Age: 39
End: 2019-04-16

## 2019-04-17 ENCOUNTER — APPOINTMENT (OUTPATIENT)
Dept: ULTRASOUND IMAGING | Facility: CLINIC | Age: 39
End: 2019-04-17

## 2019-04-17 PROCEDURE — 93975 VASCULAR STUDY: CPT

## 2019-04-17 PROCEDURE — 93975 VASCULAR STUDY: CPT | Mod: 26

## 2019-04-17 PROCEDURE — G0463: CPT

## 2019-05-20 ENCOUNTER — APPOINTMENT (OUTPATIENT)
Dept: UROLOGY | Facility: HOSPITAL | Age: 39
End: 2019-05-20

## 2019-05-29 NOTE — H&P ADULT - PROBLEM SELECTOR PROBLEM 2
Vascular Surgery Consultation    Date of Admission:  5/27/2019  6:44 PM  Date of Consultation:  5/29/2019    PCP:  Ranjith Muhammad MD       Chief Complaint: Left leg swelling    History of Present Illness: We are asked to see this patient in consultation by   Regarding DVT. Robert Valdez is a 81 y.o. female who presented with swelling of L leg. Onest after 4 hour Car ride. Associated with pain- could not stand due to pain. No prior history of DVT. She denies CP or SOB. Leg feels better since admit to hospital.  Was initially seen in ED after Vasc lab hours and given dose of Lovenox. Venous duplex then performed consistent with DVT. She has been placed on Wt based Heparin drip. Past Medical History:  Past Medical History:   Diagnosis Date    Arthritis     Arthritis     possibly Rheumatoid, Dr. Malou Ron CAD (coronary artery disease)     GERD (gastroesophageal reflux disease)     Hyperlipidemia     Hypertension     Thyroid disease        Past Surgical History:  Past Surgical History:   Procedure Laterality Date    CHOLECYSTECTOMY      CORONARY ANGIOPLASTY WITH STENT PLACEMENT      JOINT REPLACEMENT      \"knees and hips\"    WA ESOPHAGOGASTRODUODENOSCOPY TRANSORAL DIAGNOSTIC N/A 11/7/2018    EGD ESOPHAGOGASTRODUODENOSCOPY performed by Laxmi Heath MD at [address] [place] [address] Medications:   Prior to Admission medications    Medication Sig Start Date End Date Taking? Authorizing Provider   zolpidem (AMBIEN) 10 MG tablet Take 10 mg by mouth nightly as needed for Sleep.    Yes Historical Provider, MD   pantoprazole (PROTONIX) 40 MG tablet Take 1 tablet by mouth 2 times daily (before meals)  Patient taking differently: Take 40 mg by mouth daily  11/8/18  Yes Sharonda Miller MD   sulfaSALAzine (AZULFIDINE) 500 MG tablet Take 500 mg by mouth 2 times daily   Yes Historical Provider, MD   leflunomide (ARAVA) 20 MG tablet Take 20 mg by mouth daily   Yes Historical Provider, MD   aspirin 81 MG tablet Take 81 mg by mouth daily   Yes Historical Provider, MD   levothyroxine (SYNTHROID) 100 MCG tablet Take 100 mcg by mouth Daily   Yes Historical Provider, MD   losartan (COZAAR) 50 MG tablet Take 50 mg by mouth daily   Yes Historical Provider, MD   atenolol (TENORMIN) 25 MG tablet Take 25 mg by mouth daily   Yes Historical Provider, MD   HYDROcodone-acetaminophen (NORCO) 5-325 MG per tablet Take 1 tablet by mouth 2 times daily as needed for Pain. .   Yes Historical Provider, MD        Facility Administered Medications:    aspirin  81 mg Oral Daily    atenolol  25 mg Oral Daily    leflunomide  20 mg Oral Daily    levothyroxine  100 mcg Oral Daily    losartan  50 mg Oral Daily    pantoprazole  40 mg Oral Daily    sulfaSALAzine  500 mg Oral BID    sodium chloride flush  10 mL Intravenous 2 times per day       Allergies:  Adhesive tape and Amlodipine     Social History:      Social History     Socioeconomic History    Marital status:      Spouse name: Not on file    Number of children: Not on file    Years of education: Not on file    Highest education level: Not on file   Occupational History    Not on file   Social Needs    Financial resource strain: Not on file    Food insecurity:     Worry: Not on file     Inability: Not on file    Transportation needs:     Medical: Not on file     Non-medical: Not on file   Tobacco Use    Smoking status: Never Smoker    Smokeless tobacco: Never Used   Substance and Sexual Activity    Alcohol use: No    Drug use: No    Sexual activity: Not on file   Lifestyle    Physical activity:     Days per week: Not on file     Minutes per session: Not on file    Stress: Not on file   Relationships    Social connections:     Talks on phone: Not on file     Gets together: Not on file     Attends Pent[employer]al service: Not on file     Active member of club or organization: Not on file     Attends meetings of clubs or organizations: Not on file     Relationship status: Not on file    Intimate partner violence:     Fear of current or ex partner: Not on file     Emotionally abused: Not on file     Physically abused: Not on file     Forced sexual activity: Not on file   Other Topics Concern    Not on file   Social History Narrative    Not on file       Family History:    History reviewed. No pertinent family history. Review of Systems:  A 14 point review of systems was completed. Pertinent positives identified in the HPI, all other review of systems negative. Physical Examination:    /81   Pulse 74   Temp 98.5 °F (36.9 °C) (Oral)   Resp 18   Ht 5' 2\" (1.575 m)   Wt 160 lb (72.6 kg)   SpO2 93%   BMI 29.26 kg/m²        Admission Weight: 160 lb (72.6 kg)       General appearance: NAD  Eyes: PERRLA  Neck: no JVD, no lymphadenopathy. Respiratory: effort is unlabored, no crackles, wheezes or rubs. Cardiovascular: regular, no murmur. No carotid bruits. Pulses:    femoral DP   RIGHT 2 2   LEFT 2 2   GI: abdomen soft, nondistended, no organomegaly. Musculoskeletal: strength and tone normal.  Extremities: LLE edema- thigh soft. Calf non tender. Edema extend into foot. Skin: no dermatitis or ulceration. Neuro/psychiatric: grossly intact. MEDICAL DECISION MAKING/TESTING      Venous Duplex:    Right   1. There is complete compressibility of the common femoral vein. 2. There is normal spontaneous and phasic flow in the common femoral vein. Left   1. Technically limited exam due to swelling and patient's inability to   tolerate compressions. There is hypoechoic partially occluding material   present in the lumen of the common femoral vein and the saphenofemoral   junction. There is hypoechoic totally occluding material present in the   proximal, mid and distal femoral vein and the deep femoral vein. 2. There is abnormal absent flow involving the common femoral, deep femoral   and femoral veins. Labs:   CBC:   Recent Labs     05/27/19 1946 05/28/19 1738   WBC 5.9 5.8   HGB 11.3* 11.9*   HCT 34.3* 36.1   .1* 108.9*    181     BMP:   Recent Labs     05/27/19 1946      K 4.0      CO2 28   BUN 17   CREATININE <0.5*   CALCIUM 9.1     Cardiac Enzymes: No results for input(s): CKTOTAL, CKMB, CKMBINDEX, TROPONINI in the last 72 hours. PT/INR:   Recent Labs     05/27/19 1946   PROTIME 11.6   INR 1.02     APTT:   Recent Labs     05/28/19  1739 05/29/19  0112 05/29/19  0610   APTT 37.1* 115.4* 68.1*     Liver Profile:  Lab Results   Component Value Date    AST 25 05/27/2019    ALT 15 05/27/2019    BILITOT 0.5 05/27/2019    ALKPHOS 97 05/27/2019   No results found for: CHOL, HDL, TRIG  TSH:  No results found for: TSH  UA:   Lab Results   Component Value Date    COLORU Yellow 05/28/2019    PHUR 6.0 05/28/2019    WBCUA 0-2 05/28/2019    RBCUA 3-5 05/28/2019    BACTERIA 4+ 05/28/2019    CLARITYU Clear 05/28/2019    SPECGRAV <=1.005 05/28/2019    LEUKOCYTESUR Negative 05/28/2019    UROBILINOGEN 0.2 05/28/2019    BILIRUBINUR Negative 05/28/2019    BLOODU TRACE-LYSED 05/28/2019    GLUCOSEU Negative 05/28/2019    AMORPHOUS Rare 05/28/2019           Diagnosis:  LLE DVT. No evidence phlegmasia. Symptoms improved after being at bedrest in hospital.     Recommendations: Anticoagulation, Elevation, and compression. Leg should be wrapped with ACE initially then she should be measured for a 30-40mmHg knee high zippered stocking prior to discharge. Risk of intervention and thrombolytics greater than benefit in this 82 yo. Sepsis due to Escherichia coli (E. coli)

## 2019-06-24 NOTE — DISCHARGE NOTE ADULT - PATIENT PORTAL LINK FT
lower “You can access the [facility] Patient Portal, offered by [facility], by registering with the following website: [email or web address]”

## 2019-06-26 LAB — BACTERIA UR CULT: NORMAL

## 2019-07-07 PROBLEM — Z86.19 HISTORY OF CLOSTRIDIUM DIFFICILE COLITIS: Status: RESOLVED | Noted: 2018-01-16 | Resolved: 2019-07-07

## 2020-01-17 ENCOUNTER — OUTPATIENT (OUTPATIENT)
Dept: OUTPATIENT SERVICES | Facility: HOSPITAL | Age: 40
LOS: 1 days | End: 2020-01-17
Payer: SELF-PAY

## 2020-01-17 ENCOUNTER — APPOINTMENT (OUTPATIENT)
Dept: INTERNAL MEDICINE | Facility: CLINIC | Age: 40
End: 2020-01-17

## 2020-01-17 ENCOUNTER — LABORATORY RESULT (OUTPATIENT)
Age: 40
End: 2020-01-17

## 2020-01-17 VITALS
SYSTOLIC BLOOD PRESSURE: 112 MMHG | BODY MASS INDEX: 28.62 KG/M2 | DIASTOLIC BLOOD PRESSURE: 70 MMHG | WEIGHT: 191 LBS | HEIGHT: 68.5 IN

## 2020-01-17 DIAGNOSIS — Z87.891 PERSONAL HISTORY OF NICOTINE DEPENDENCE: ICD-10-CM

## 2020-01-17 DIAGNOSIS — A04.8 OTHER SPECIFIED BACTERIAL INTESTINAL INFECTIONS: ICD-10-CM

## 2020-01-17 DIAGNOSIS — Z98.890 OTHER SPECIFIED POSTPROCEDURAL STATES: Chronic | ICD-10-CM

## 2020-01-17 DIAGNOSIS — I10 ESSENTIAL (PRIMARY) HYPERTENSION: ICD-10-CM

## 2020-01-17 PROCEDURE — G0463: CPT | Mod: 25

## 2020-01-17 PROCEDURE — G0008: CPT

## 2020-01-17 PROCEDURE — 90688 IIV4 VACCINE SPLT 0.5 ML IM: CPT

## 2020-01-19 PROBLEM — A04.8 POSITIVE H. PYLORI TEST: Status: RESOLVED | Noted: 2018-01-10 | Resolved: 2020-01-19

## 2020-01-19 NOTE — HISTORY OF PRESENT ILLNESS
[[employer] ] : provided by [employer]   [FreeTextEntry1] : [id number] [TWNoteComboBox1] : [place]ian [de-identified] : 40 yo M Divehi-speaking w/ hx of obesity, MRSA abscess/cellulitis, pyelonephritis, H.pylori gastritis s/p treatment, C. diff colitis following abx use (2018) presenting for CPE. Last visit in March 2019 for testicular pain, referred to Urology for recurrent urogenital infections and found scrotal pain likely 2/2 varicoceles. Patient reports feeling good aside from the following problems:\par \par #Blurry vision in R eye  - Has had for the 4 past yrs. Has seen eye doctor before but told he has cataract but no discussion about removing it. Endorses sensitivity to light and intermittent dark spots in vision in R eye, sometimes like a curtain that goes from R-side to L-side. Rates it as a 6/10 in severity. Occurs randomly, not associated with any change in position. \par \par #Lump on L-side of chest - started a 1.5 yr ago and has been getting bigger. Had 8 under the skin of his stomach/back area over the coarse of past 19 years, 3 of these lumps have been taken out before and were found to be lipomas. They all cause some pain, but this one on the side of his chest has been particularly painful, normally at 3/10 pain but sometimes go up to 7/10. Pain increases with palpation, otherwise is usually a mild sharp pain, non-radiating.\par \par Endorses intentional weight loss. Has lost 12 lbs since April 2019. Denies other CP, SOB, dyspnea, diaphoresis, abdominal pain, diarrhea, constipation, dysuria, hematuria, hematochezia, melena, dizziness.

## 2020-01-19 NOTE — HEALTH RISK ASSESSMENT
[Very Good] : ~his/her~ current health as very good [Excellent] : ~his/her~  mood as  excellent [Intercurrent ED visits] : went to ED [16-20] : 16-20 [] : Yes [Yes] : Yes [Monthly or less (1 pt)] : Monthly or less (1 point) [1 or 2 (0 pts)] : 1 or 2 (0 points) [Never (0 pts)] : Never (0 points) [No] : In the past 12 months have you used drugs other than those required for medical reasons? No [No falls in past year] : Patient reported no falls in the past year [0] : 2) Feeling down, depressed, or hopeless: Not at all (0) [HIV Test offered] : HIV Test offered [With Family] : lives with family [None] : None [Unemployed] : unemployed [Fully functional (bathing, dressing, toileting, transferring, walking, feeding)] : Fully functional (bathing, dressing, toileting, transferring, walking, feeding) [] :  [Fully functional (using the telephone, shopping, preparing meals, housekeeping, doing laundry, using] : Fully functional and needs no help or supervision to perform IADLs (using the telephone, shopping, preparing meals, housekeeping, doing laundry, using transportation, managing medications and managing finances) [Reports changes in vision] : Reports changes in vision [Reports normal functional visual acuity (ie: able to read med bottle)] : Reports normal functional visual acuity [Name: ___] : Health Care Proxy's Name: [unfilled]  [With Patient/Caregiver] : With Patient/Caregiver [Designated Healthcare Proxy] : Designated healthcare proxy [Aggressive treatment] : aggressive treatment [Relationship: ___] : Relationship: [unfilled] [Sexually Active] : sexually active [Feels Safe at Home] : Feels safe at home [FreeTextEntry1] : Vision mainly, and pain from lump on chest [de-identified] : for high blood pressure in Jan 2019 [de-identified] : Urologist for testicular/scrotal pain in April 2019 [de-identified] : Now smoking 2-3 cigs/day, previously one pack per day, smoking for 22 yrs now [de-identified] : socially [Audit-CScore] : 1 [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 [de-identified] : Eat everything [RCD1Tcoeb] : 0 [Change in mental status noted] : No change in mental status noted [Language] : denies difficulty with language [Behavior] : denies difficulty with behavior [Learning/Retaining New Information] : denies difficulty learning/retaining new information [Handling Complex Tasks] : denies difficulty handling complex tasks [Reasoning] : denies difficulty with reasoning [Spatial Ability and Orientation] : denies difficulty with spatial ability and orientation [High Risk Behavior] : no high risk behavior [Reports changes in hearing] : Reports no changes in hearing [HIVDate] : 07/11 [HIVComments] : previously negative, pt would like to be retested [FreeTextEntry2] : 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

## 2020-01-19 NOTE — PHYSICAL EXAM
[EOMI] : extraocular movements intact [PERRL] : pupils equal round and reactive to light [No JVD] : no jugular venous distention [Supple] : supple [No Varicosities] : no varicosities [Pedal Pulses Present] : the pedal pulses are present [No Edema] : there was no peripheral edema [No Extremity Clubbing/Cyanosis] : no extremity clubbing/cyanosis [No Skin Lesions] : no skin lesions [No Rash] : no rash [Normal] : normal gait, coordination grossly intact, no focal deficits and deep tendon reflexes were 2+ and symmetric [de-identified] : white-colored growth over left side of R eye, appears to be overlying border of iris and sclera [de-identified] : palpable, soft, mobile masses of various sizes under skin of abdomen, back, legs, and one ~5cm on L-side of chest anterior axillary by level of L nipple

## 2020-01-19 NOTE — ASSESSMENT
[FreeTextEntry1] : 40 yo M Sami-speaking w/ hx of obesity, MRSA abscess/cellulitis, pyelonephritis, H.pylori gastritis s/p treatment, C. diff colitis following abx use (2018) presenting for CPE. Last visit in March 2019 for testicular pain, referred to Urology for recurrent urogenital infections. Patient reports feeling good aside from the following problems:\par \par #Blurry vision in R eye\par -possibly 2/2 pterygium given exam findings\par -advised pt can try using artificial tears \par -Opthalmology referral given for further evaluation\par \par #Lump on L-side of chest\par -likely lipoma given hx of multiple lipomas\par -General Surgery referral given for evaluation of excision\par \par #Overweight, hx of obesity\par -BMI 28.62, down from BMI 30.4 in April 2019\par -encouraged continued efforts in losing weight\par -counseled on benefits of weight loss and lifestyle modifications including healthier diets and exercise\par \par #HCM\par -PHQ-2 score of 0\par -Flu vaccine today\par -Labs ordered: CBC, CMP, Lipid profile, HbA1c, HIV testing\par \par RTC in 1 yr for annual CPE, or sooner PRN\par d/w Dr. Link

## 2020-01-19 NOTE — REVIEW OF SYSTEMS
[Vision Problems] : vision problems [Negative] : Neurological [Fever] : no fever [Chills] : no chills [Fatigue] : no fatigue [Night Sweats] : no night sweats [Discharge] : no discharge [Pain] : no pain [Itching] : no itching [Earache] : no earache [Hearing Loss] : no hearing loss [Nosebleeds] : no nosebleeds [Sore Throat] : no sore throat [Chest Pain] : no chest pain [Palpitations] : no palpitations [Lower Ext Edema] : no lower extremity edema [Shortness Of Breath] : no shortness of breath [Wheezing] : no wheezing [Cough] : no cough [Abdominal Pain] : no abdominal pain [Nausea] : no nausea [Constipation] : no constipation [Diarrhea] : no diarrhea [Vomiting] : no vomiting [Melena] : no melena [Dysuria] : no dysuria [Hematuria] : no hematuria [Joint Pain] : no joint pain [Muscle Pain] : no muscle pain [Muscle Weakness] : no muscle weakness [Back Pain] : no back pain [Joint Swelling] : no joint swelling [Itching] : no itching [Skin Rash] : no skin rash [Headache] : no headache [Dizziness] : no dizziness [Fainting] : no fainting [Memory Loss] : no memory loss [FreeTextEntry5] : P [de-identified] : Pain where the lumps are all over body

## 2020-01-22 DIAGNOSIS — Z23 ENCOUNTER FOR IMMUNIZATION: ICD-10-CM

## 2020-01-22 DIAGNOSIS — E66.3 OVERWEIGHT: ICD-10-CM

## 2020-01-22 DIAGNOSIS — D17.9 BENIGN LIPOMATOUS NEOPLASM, UNSPECIFIED: ICD-10-CM

## 2020-01-22 DIAGNOSIS — H53.8 OTHER VISUAL DISTURBANCES: ICD-10-CM

## 2020-01-22 DIAGNOSIS — F17.200 NICOTINE DEPENDENCE, UNSPECIFIED, UNCOMPLICATED: ICD-10-CM

## 2020-02-03 ENCOUNTER — APPOINTMENT (OUTPATIENT)
Dept: SURGERY | Facility: HOSPITAL | Age: 40
End: 2020-02-03
Payer: COMMERCIAL

## 2020-02-03 ENCOUNTER — OUTPATIENT (OUTPATIENT)
Dept: OUTPATIENT SERVICES | Facility: HOSPITAL | Age: 40
LOS: 1 days | End: 2020-02-03
Payer: SELF-PAY

## 2020-02-03 VITALS
HEART RATE: 73 BPM | DIASTOLIC BLOOD PRESSURE: 82 MMHG | WEIGHT: 192 LBS | BODY MASS INDEX: 28.77 KG/M2 | HEIGHT: 68.5 IN | SYSTOLIC BLOOD PRESSURE: 126 MMHG

## 2020-02-03 DIAGNOSIS — D17.9 BENIGN LIPOMATOUS NEOPLASM, UNSPECIFIED: ICD-10-CM

## 2020-02-03 DIAGNOSIS — Z98.890 OTHER SPECIFIED POSTPROCEDURAL STATES: Chronic | ICD-10-CM

## 2020-02-03 PROCEDURE — 99242 OFF/OP CONSLTJ NEW/EST SF 20: CPT | Mod: 57

## 2020-02-03 PROCEDURE — G0463: CPT

## 2020-02-03 NOTE — ADDENDUM
[FreeTextEntry1] : He had excision of multiple lipomas @ [facility] in 2001, but pathology is not available. Given his pain, these could be angiolipomas. The left flank lipoma is approximately 5 cm in diameter. The left chest wall lipoma is approximately 2 cm in diameter. The others are smaller. I offered excision of the two most painful lipomas, but he declines surgery at this time.\par

## 2020-02-03 NOTE — REVIEW OF SYSTEMS
[Eye Pain] : eye pain [Eyesight Problems] : eyesight problems [Dry Eyes] : dryness of the eyes [Negative] : Endocrine

## 2020-02-03 NOTE — PLAN
[FreeTextEntry1] : No surgical intervention necessary at this time. If mass continues to increase in size and becomes more painful, surgical excision may be warranted.

## 2020-02-03 NOTE — HISTORY OF PRESENT ILLNESS
[de-identified] : 38 yo M Indonesian-speaking w/ hx of obesity, complaining of Lump on L-side of chest - started 4 months ago and has been increasing in size. Had multiple under the skin of his stomach/back area over the course of past 19 years, 3 of these lumps have been removed and found to be lipomas. They all cause some discomfort but this one on the side of his chest has been particularly painful, normally at 3/10 pain but sometimes go up to 7/10. Pain increases with palpation, otherwise is usually a mild sharp pain, non-radiating.\par \par

## 2020-02-03 NOTE — PHYSICAL EXAM
[Normal Rate and Rhythm] : normal rate and rhythm [Normal Breath Sounds] : Normal breath sounds [No Rash or Lesion] : No rash or lesion [Alert] : alert [Oriented to Person] : oriented to person [Oriented to Place] : oriented to place [Oriented to Time] : oriented to time [Calm] : calm [JVD] : no jugular venous distention  [Abdominal Masses] : No abdominal masses [de-identified] : Well developed, overweight [de-identified] : Multiple mobile circumscribed masses palpated in lower left abdominal area,lower back and left lateral chest

## 2020-02-03 NOTE — ASSESSMENT
[FreeTextEntry1] : 38 yo M presenting with increasing in size mass in left chest associated with pain. Has had previous lipomas in the past that were removed.

## 2020-02-04 ENCOUNTER — APPOINTMENT (OUTPATIENT)
Dept: OPHTHALMOLOGY | Facility: CLINIC | Age: 40
End: 2020-02-04

## 2020-02-04 DIAGNOSIS — D17.1 BENIGN LIPOMATOUS NEOPLASM OF SKIN AND SUBCUTANEOUS TISSUE OF TRUNK: ICD-10-CM

## 2020-02-17 ENCOUNTER — EMERGENCY (EMERGENCY)
Facility: HOSPITAL | Age: 40
LOS: 1 days | Discharge: HOME | End: 2020-02-17
Attending: EMERGENCY MEDICINE
Payer: MEDICAID

## 2020-02-17 VITALS
OXYGEN SATURATION: 100 % | SYSTOLIC BLOOD PRESSURE: 124 MMHG | HEIGHT: 66 IN | RESPIRATION RATE: 18 BRPM | HEART RATE: 69 BPM | WEIGHT: 190.92 LBS | TEMPERATURE: 98 F | DIASTOLIC BLOOD PRESSURE: 81 MMHG

## 2020-02-17 VITALS
TEMPERATURE: 98 F | OXYGEN SATURATION: 100 % | RESPIRATION RATE: 18 BRPM | HEART RATE: 68 BPM | DIASTOLIC BLOOD PRESSURE: 79 MMHG | SYSTOLIC BLOOD PRESSURE: 118 MMHG

## 2020-02-17 DIAGNOSIS — Z98.890 OTHER SPECIFIED POSTPROCEDURAL STATES: Chronic | ICD-10-CM

## 2020-02-17 LAB
ALBUMIN SERPL ELPH-MCNC: 4.3 G/DL — SIGNIFICANT CHANGE UP (ref 3.3–5)
ALP SERPL-CCNC: 83 U/L — SIGNIFICANT CHANGE UP (ref 40–120)
ALT FLD-CCNC: 41 U/L — SIGNIFICANT CHANGE UP (ref 10–45)
ANION GAP SERPL CALC-SCNC: 13 MMOL/L — SIGNIFICANT CHANGE UP (ref 5–17)
APTT BLD: 28.2 SEC — SIGNIFICANT CHANGE UP (ref 27.5–36.3)
AST SERPL-CCNC: 20 U/L — SIGNIFICANT CHANGE UP (ref 10–40)
BASE EXCESS BLDV CALC-SCNC: 2.1 MMOL/L — HIGH (ref -2–2)
BASOPHILS # BLD AUTO: 0.03 K/UL — SIGNIFICANT CHANGE UP (ref 0–0.2)
BASOPHILS NFR BLD AUTO: 0.4 % — SIGNIFICANT CHANGE UP (ref 0–2)
BILIRUB SERPL-MCNC: 0.2 MG/DL — SIGNIFICANT CHANGE UP (ref 0.2–1.2)
BUN SERPL-MCNC: 10 MG/DL — SIGNIFICANT CHANGE UP (ref 7–23)
CA-I SERPL-SCNC: 1.21 MMOL/L — SIGNIFICANT CHANGE UP (ref 1.12–1.3)
CALCIUM SERPL-MCNC: 9.2 MG/DL — SIGNIFICANT CHANGE UP (ref 8.4–10.5)
CHLORIDE BLDV-SCNC: 108 MMOL/L — SIGNIFICANT CHANGE UP (ref 96–108)
CHLORIDE SERPL-SCNC: 105 MMOL/L — SIGNIFICANT CHANGE UP (ref 96–108)
CK MB BLD-MCNC: 1.3 % — SIGNIFICANT CHANGE UP (ref 0–3.5)
CK MB CFR SERPL CALC: 1.4 NG/ML — SIGNIFICANT CHANGE UP (ref 0–6.7)
CK SERPL-CCNC: 104 U/L — SIGNIFICANT CHANGE UP (ref 30–200)
CO2 BLDV-SCNC: 31 MMOL/L — HIGH (ref 22–30)
CO2 SERPL-SCNC: 24 MMOL/L — SIGNIFICANT CHANGE UP (ref 22–31)
CREAT SERPL-MCNC: 0.74 MG/DL — SIGNIFICANT CHANGE UP (ref 0.5–1.3)
EOSINOPHIL # BLD AUTO: 0.16 K/UL — SIGNIFICANT CHANGE UP (ref 0–0.5)
EOSINOPHIL NFR BLD AUTO: 2.2 % — SIGNIFICANT CHANGE UP (ref 0–6)
GAS PNL BLDV: 138 MMOL/L — SIGNIFICANT CHANGE UP (ref 135–145)
GAS PNL BLDV: SIGNIFICANT CHANGE UP
GAS PNL BLDV: SIGNIFICANT CHANGE UP
GLUCOSE BLDV-MCNC: 89 MG/DL — SIGNIFICANT CHANGE UP (ref 70–99)
GLUCOSE SERPL-MCNC: 95 MG/DL — SIGNIFICANT CHANGE UP (ref 70–99)
HCO3 BLDV-SCNC: 29 MMOL/L — SIGNIFICANT CHANGE UP (ref 21–29)
HCT VFR BLD CALC: 45.4 % — SIGNIFICANT CHANGE UP (ref 39–50)
HCT VFR BLDA CALC: 50 % — SIGNIFICANT CHANGE UP (ref 39–50)
HGB BLD CALC-MCNC: 16.2 G/DL — SIGNIFICANT CHANGE UP (ref 13–17)
HGB BLD-MCNC: 15.4 G/DL — SIGNIFICANT CHANGE UP (ref 13–17)
IMM GRANULOCYTES NFR BLD AUTO: 1.1 % — SIGNIFICANT CHANGE UP (ref 0–1.5)
INR BLD: 0.91 RATIO — SIGNIFICANT CHANGE UP (ref 0.88–1.16)
LACTATE BLDV-MCNC: 1.4 MMOL/L — SIGNIFICANT CHANGE UP (ref 0.7–2)
LYMPHOCYTES # BLD AUTO: 2.45 K/UL — SIGNIFICANT CHANGE UP (ref 1–3.3)
LYMPHOCYTES # BLD AUTO: 33.5 % — SIGNIFICANT CHANGE UP (ref 13–44)
MAGNESIUM SERPL-MCNC: 2.1 MG/DL — SIGNIFICANT CHANGE UP (ref 1.6–2.6)
MCHC RBC-ENTMCNC: 29.7 PG — SIGNIFICANT CHANGE UP (ref 27–34)
MCHC RBC-ENTMCNC: 33.9 GM/DL — SIGNIFICANT CHANGE UP (ref 32–36)
MCV RBC AUTO: 87.5 FL — SIGNIFICANT CHANGE UP (ref 80–100)
MONOCYTES # BLD AUTO: 0.59 K/UL — SIGNIFICANT CHANGE UP (ref 0–0.9)
MONOCYTES NFR BLD AUTO: 8.1 % — SIGNIFICANT CHANGE UP (ref 2–14)
NEUTROPHILS # BLD AUTO: 4 K/UL — SIGNIFICANT CHANGE UP (ref 1.8–7.4)
NEUTROPHILS NFR BLD AUTO: 54.7 % — SIGNIFICANT CHANGE UP (ref 43–77)
NRBC # BLD: 0 /100 WBCS — SIGNIFICANT CHANGE UP (ref 0–0)
NT-PROBNP SERPL-SCNC: 15 PG/ML — SIGNIFICANT CHANGE UP (ref 0–300)
PCO2 BLDV: 56 MMHG — HIGH (ref 35–50)
PH BLDV: 7.33 — LOW (ref 7.35–7.45)
PHOSPHATE SERPL-MCNC: 4.5 MG/DL — SIGNIFICANT CHANGE UP (ref 2.5–4.5)
PLATELET # BLD AUTO: SIGNIFICANT CHANGE UP (ref 150–400)
PO2 BLDV: 33 MMHG — SIGNIFICANT CHANGE UP (ref 25–45)
POTASSIUM BLDV-SCNC: 3.9 MMOL/L — SIGNIFICANT CHANGE UP (ref 3.5–5.3)
POTASSIUM SERPL-MCNC: 4.3 MMOL/L — SIGNIFICANT CHANGE UP (ref 3.5–5.3)
POTASSIUM SERPL-SCNC: 4.3 MMOL/L — SIGNIFICANT CHANGE UP (ref 3.5–5.3)
PROT SERPL-MCNC: 7.1 G/DL — SIGNIFICANT CHANGE UP (ref 6–8.3)
PROTHROM AB SERPL-ACNC: 10.4 SEC — SIGNIFICANT CHANGE UP (ref 10–12.9)
RBC # BLD: 5.19 M/UL — SIGNIFICANT CHANGE UP (ref 4.2–5.8)
RBC # FLD: 12.2 % — SIGNIFICANT CHANGE UP (ref 10.3–14.5)
SAO2 % BLDV: 54 % — LOW (ref 67–88)
SODIUM SERPL-SCNC: 142 MMOL/L — SIGNIFICANT CHANGE UP (ref 135–145)
TROPONIN T, HIGH SENSITIVITY RESULT: <6 NG/L — SIGNIFICANT CHANGE UP (ref 0–51)
WBC # BLD: 7.31 K/UL — SIGNIFICANT CHANGE UP (ref 3.8–10.5)
WBC # FLD AUTO: 7.31 K/UL — SIGNIFICANT CHANGE UP (ref 3.8–10.5)

## 2020-02-17 PROCEDURE — 82550 ASSAY OF CK (CPK): CPT

## 2020-02-17 PROCEDURE — 99285 EMERGENCY DEPT VISIT HI MDM: CPT

## 2020-02-17 PROCEDURE — 84484 ASSAY OF TROPONIN QUANT: CPT

## 2020-02-17 PROCEDURE — 84132 ASSAY OF SERUM POTASSIUM: CPT

## 2020-02-17 PROCEDURE — 82553 CREATINE MB FRACTION: CPT

## 2020-02-17 PROCEDURE — 85027 COMPLETE CBC AUTOMATED: CPT

## 2020-02-17 PROCEDURE — 84100 ASSAY OF PHOSPHORUS: CPT

## 2020-02-17 PROCEDURE — 80053 COMPREHEN METABOLIC PANEL: CPT

## 2020-02-17 PROCEDURE — 99284 EMERGENCY DEPT VISIT MOD MDM: CPT | Mod: 25

## 2020-02-17 PROCEDURE — 82947 ASSAY GLUCOSE BLOOD QUANT: CPT

## 2020-02-17 PROCEDURE — 93005 ELECTROCARDIOGRAM TRACING: CPT

## 2020-02-17 PROCEDURE — 71046 X-RAY EXAM CHEST 2 VIEWS: CPT

## 2020-02-17 PROCEDURE — 85379 FIBRIN DEGRADATION QUANT: CPT

## 2020-02-17 PROCEDURE — 71046 X-RAY EXAM CHEST 2 VIEWS: CPT | Mod: 26

## 2020-02-17 PROCEDURE — 85730 THROMBOPLASTIN TIME PARTIAL: CPT

## 2020-02-17 PROCEDURE — 84295 ASSAY OF SERUM SODIUM: CPT

## 2020-02-17 PROCEDURE — 85610 PROTHROMBIN TIME: CPT

## 2020-02-17 PROCEDURE — 83880 ASSAY OF NATRIURETIC PEPTIDE: CPT

## 2020-02-17 PROCEDURE — 82803 BLOOD GASES ANY COMBINATION: CPT

## 2020-02-17 PROCEDURE — 82330 ASSAY OF CALCIUM: CPT

## 2020-02-17 PROCEDURE — 83605 ASSAY OF LACTIC ACID: CPT

## 2020-02-17 PROCEDURE — 83735 ASSAY OF MAGNESIUM: CPT

## 2020-02-17 PROCEDURE — 82435 ASSAY OF BLOOD CHLORIDE: CPT

## 2020-02-17 PROCEDURE — 85014 HEMATOCRIT: CPT

## 2020-02-17 RX ORDER — LIDOCAINE 4 G/100G
1 CREAM TOPICAL ONCE
Refills: 0 | Status: COMPLETED | OUTPATIENT
Start: 2020-02-17 | End: 2020-02-17

## 2020-02-17 RX ORDER — ASPIRIN/CALCIUM CARB/MAGNESIUM 324 MG
162 TABLET ORAL ONCE
Refills: 0 | Status: COMPLETED | OUTPATIENT
Start: 2020-02-17 | End: 2020-02-17

## 2020-02-17 RX ORDER — ACETAMINOPHEN 500 MG
975 TABLET ORAL ONCE
Refills: 0 | Status: COMPLETED | OUTPATIENT
Start: 2020-02-17 | End: 2020-02-17

## 2020-02-17 RX ADMIN — Medication 975 MILLIGRAM(S): at 18:34

## 2020-02-17 RX ADMIN — LIDOCAINE 1 PATCH: 4 CREAM TOPICAL at 18:34

## 2020-02-17 RX ADMIN — Medication 162 MILLIGRAM(S): at 17:22

## 2020-02-17 NOTE — ED PROVIDER NOTE - ATTENDING CONTRIBUTION TO CARE
Dr. Diaz (Attending Physician)  I performed a history and physical exam of the patient and discussed their management with the resident. I reviewed the resident's note and agree with the documented findings and plan of care. My medical decision making and observations are found above.

## 2020-02-17 NOTE — ED PROVIDER NOTE - NSFOLLOWUPCLINICS_GEN_ALL_ED_FT
[facility]  Cardiology  [address] [place] [address]  Phone: [phone]  Fax:   Follow Up Time: 1-3 Days

## 2020-02-17 NOTE — ED PROVIDER NOTE - CLINICAL SUMMARY MEDICAL DECISION MAKING FREE TEXT BOX
38 YO M with no significant PMHx who presents with chest pain since this morning.   Can be 2/2 MSK Vs aortic dissection Vs NSTEMI\  -f/u CXR and cardiac enzymes, and cbc, BMP, PRO-BNP Dr. Diaz (Attending Physician)  40 YO M with no significant PMHx who presents with chest pain since this morning 3 out of 10 when starting now 5/10, has been having upper back pain for the past 3 days worse with movement. Works as .  Equal pulses bilaterally. No BP differential between arms, very unlikely aortic dissection.  Can be 2/2 MSK vs ACS will check cardiac enzymes, and cbc, BMP, PRO-BNP, d-dimer, cxr

## 2020-02-17 NOTE — ED PROVIDER NOTE - PHYSICAL EXAMINATION
PHYSICAL EXAM:    Constitutional: WDWN resting comfortably in bed; NAD  Head: NC/AT  Eyes: PERRL, EOMI, anicteric sclera  ENT: no nasal discharge, MMM  Neck: supple; no JVD appreciated  Respiratory: CTA B/L; no W/R/R, no increased work of breathing  Cardiac: +S1/S2; RRR; no M/R/G  Gastrointestinal: soft, NT/ND; no rebound or guarding; +BSx4  Extremities: WWP, no cyanosis; no peripheral edema  Musculoskeletal: NROM x4; no joint swelling, tenderness or erythema  Vascular: 2+ radial, DP pulses B/L  Dermatologic: skin warm, dry and intact  Neurologic: Alert and orientedx3, no focal deficits appreciated  Psychiatric: affect and characteristics of appearance, verbalizations, behaviors are appropriate

## 2020-02-17 NOTE — ED PROVIDER NOTE - PATIENT PORTAL LINK FT
You can access the FollowMyHealth Patient Portal offered by [facility] by registering at the following website: [email or web address]. By joining [employer]’s FollowMyHealth portal, you will also be able to view your health information using other applications (apps) compatible with our system.

## 2020-02-17 NOTE — ED ADULT NURSE NOTE - CHPI ED NUR SYMPTOMS NEG
no fever/no vomiting/no chest pain/no shortness of breath/no chills/no nausea/no diaphoresis/no dizziness/no syncope/no back pain/no congestion

## 2020-02-17 NOTE — ED PROVIDER NOTE - NSFOLLOWUPINSTRUCTIONS_ED_ALL_ED_FT
Chest Pain    Chest pain can be caused by many different conditions which may or may not be dangerous. Causes include heartburn, lung infections, heart attack, blood clot in lungs, skin infections, strain or damage to muscle, cartilage, or bones, etc. In addition to a history and physical examination, an electrocardiogram (ECG) or other lab tests may have been performed to determine the cause of your chest pain. Follow up with your primary care provider or with a cardiologist as instructed.     SEEK IMMEDIATE MEDICAL CARE IF YOU HAVE ANY OF THE FOLLOWING SYMPTOMS: worsening chest pain, coughing up blood, unexplained back/neck/jaw pain, severe abdominal pain, dizziness or lightheadedness, fainting, shortness of breath, sweaty or clammy skin, vomiting, or racing heart beat. These symptoms may represent a serious problem that is an emergency. Do not wait to see if the symptoms will go away. Get medical help right away. Call 911 and do not drive yourself to the hospital.    - Follow up with your cardiology in 1-2 days.    - Follow up with your primary care doctor in 1-2 days.    - Bring results with you to the appointment.   - Take tylenol 650mg or motrin 600mg every 6 hours for pain or fever.   - Return to the ED for new or worsening symptoms.

## 2020-02-17 NOTE — ED ADULT NURSE NOTE - OBJECTIVE STATEMENT
39 year old male presented to ED with c/o of intermittent aching chest pain starting today, sternal pain radiating to left clavicle. pt reports having midline upper back pain x2 days, and today developed chest pains. pt also reports dyspnea on exertion. pt denies current CP, SOB, nausea/vomiting, numbness/tingling, fever, cough, chills, dizziness, headache, blurred vision, neuro intact. pt a&ox3, lung sounds clear, heart rate regular, EKG completed handed to MD, on cardiac monitor, abdomen soft nontender nondistended to palp. skin intact. IV in left AC 20G and patent. Will continue to monitor and assess while offering support and reassurance.

## 2020-02-17 NOTE — ED PROVIDER NOTE - OBJECTIVE STATEMENT
38 YO M with no significant PMHx who presents with chest pain since this morning. Pt states that this morning he woke up with sharp chest pain the is left substernal radiating to the jaw, left and right shoulder that is constant and 6/10. Nothing makes the pain better or worse, and he did not take anything for the pain . He denies any history of similar episode, but does endorse Upper back pain and R jaw pain for 3 days a/w shortness of breath at rest. He denies any fever chills, light headedness, dizziness, palpitations, nausea, vomiting, sweating. He denies any family history fo cardiovascular disease.

## 2020-02-18 ENCOUNTER — APPOINTMENT (OUTPATIENT)
Dept: INTERNAL MEDICINE | Facility: CLINIC | Age: 40
End: 2020-02-18

## 2020-02-18 ENCOUNTER — OUTPATIENT (OUTPATIENT)
Dept: OUTPATIENT SERVICES | Facility: HOSPITAL | Age: 40
LOS: 1 days | End: 2020-02-18
Payer: SELF-PAY

## 2020-02-18 VITALS
DIASTOLIC BLOOD PRESSURE: 70 MMHG | BODY MASS INDEX: 30.41 KG/M2 | WEIGHT: 203 LBS | HEART RATE: 79 BPM | HEIGHT: 68.5 IN | OXYGEN SATURATION: 96 % | TEMPERATURE: 98.5 F | SYSTOLIC BLOOD PRESSURE: 130 MMHG

## 2020-02-18 DIAGNOSIS — D69.1 QUALITATIVE PLATELET DEFECTS: ICD-10-CM

## 2020-02-18 DIAGNOSIS — I10 ESSENTIAL (PRIMARY) HYPERTENSION: ICD-10-CM

## 2020-02-18 DIAGNOSIS — E66.3 OVERWEIGHT: ICD-10-CM

## 2020-02-18 DIAGNOSIS — Z98.890 OTHER SPECIFIED POSTPROCEDURAL STATES: Chronic | ICD-10-CM

## 2020-02-18 PROCEDURE — G0463: CPT

## 2020-02-19 ENCOUNTER — APPOINTMENT (OUTPATIENT)
Dept: CARDIOLOGY | Facility: HOSPITAL | Age: 40
End: 2020-02-19

## 2020-02-19 ENCOUNTER — OUTPATIENT (OUTPATIENT)
Dept: OUTPATIENT SERVICES | Facility: HOSPITAL | Age: 40
LOS: 1 days | End: 2020-02-19
Payer: SELF-PAY

## 2020-02-19 VITALS
HEIGHT: 68.5 IN | WEIGHT: 203 LBS | OXYGEN SATURATION: 96 % | BODY MASS INDEX: 30.41 KG/M2 | SYSTOLIC BLOOD PRESSURE: 125 MMHG | HEART RATE: 75 BPM | DIASTOLIC BLOOD PRESSURE: 81 MMHG

## 2020-02-19 DIAGNOSIS — F17.200 NICOTINE DEPENDENCE, UNSPECIFIED, UNCOMPLICATED: ICD-10-CM

## 2020-02-19 DIAGNOSIS — Z87.891 PERSONAL HISTORY OF NICOTINE DEPENDENCE: ICD-10-CM

## 2020-02-19 DIAGNOSIS — Z98.890 OTHER SPECIFIED POSTPROCEDURAL STATES: Chronic | ICD-10-CM

## 2020-02-19 DIAGNOSIS — I25.10 ATHEROSCLEROTIC HEART DISEASE OF NATIVE CORONARY ARTERY WITHOUT ANGINA PECTORIS: ICD-10-CM

## 2020-02-19 PROBLEM — E66.3 OVERWEIGHT: Status: ACTIVE | Noted: 2020-01-19

## 2020-02-19 PROBLEM — Z78.9 OTHER SPECIFIED HEALTH STATUS: Chronic | Status: ACTIVE | Noted: 2020-02-17

## 2020-02-19 LAB
BASOPHILS # BLD AUTO: 0.02 K/UL
BASOPHILS NFR BLD AUTO: 0.3 %
EOSINOPHIL # BLD AUTO: 0.13 K/UL
EOSINOPHIL NFR BLD AUTO: 1.6 %
HCT VFR BLD CALC: 47.1 %
HGB BLD-MCNC: 15 G/DL
IMM GRANULOCYTES NFR BLD AUTO: 0.9 %
LYMPHOCYTES # BLD AUTO: 2.37 K/UL
LYMPHOCYTES NFR BLD AUTO: 30 %
MAN DIFF?: NORMAL
MCHC RBC-ENTMCNC: 29.4 PG
MCHC RBC-ENTMCNC: 31.8 GM/DL
MCV RBC AUTO: 92.4 FL
MONOCYTES # BLD AUTO: 0.69 K/UL
MONOCYTES NFR BLD AUTO: 8.7 %
NEUTROPHILS # BLD AUTO: 4.61 K/UL
NEUTROPHILS NFR BLD AUTO: 58.5 %
PLATELET # BLD AUTO: NORMAL K/UL
PLATELET # PLAS AUTO: NORMAL
RBC # BLD: 5.1 M/UL
RBC # FLD: 12.6 %
WBC # FLD AUTO: 7.89 K/UL

## 2020-02-19 PROCEDURE — 93005 ELECTROCARDIOGRAM TRACING: CPT

## 2020-02-19 PROCEDURE — G0463: CPT

## 2020-02-19 NOTE — PHYSICAL EXAM
[Normal Appearance] : normal appearance [General Appearance - Well Developed] : well developed [General Appearance - Well Nourished] : well nourished [Well Groomed] : well groomed [Normal Conjunctiva] : the conjunctiva exhibited no abnormalities [General Appearance - In No Acute Distress] : no acute distress [No Deformities] : no deformities [Eyelids - No Xanthelasma] : the eyelids demonstrated no xanthelasmas [No Oral Cyanosis] : no oral cyanosis [Normal Oral Mucosa] : normal oral mucosa [No Oral Pallor] : no oral pallor [Normal Jugular Venous V Waves Present] : normal jugular venous V waves present [Normal Jugular Venous A Waves Present] : normal jugular venous A waves present [No Jugular Venous Multani A Waves] : no jugular venous multani A waves [Heart Rate And Rhythm] : heart rate and rhythm were normal [Heart Sounds] : normal S1 and S2 [Murmurs] : no murmurs present [Respiration, Rhythm And Depth] : normal respiratory rhythm and effort [Exaggerated Use Of Accessory Muscles For Inspiration] : no accessory muscle use [Auscultation Breath Sounds / Voice Sounds] : lungs were clear to auscultation bilaterally [Abdomen Soft] : soft [Abdomen Tenderness] : non-tender [Abdomen Mass (___ Cm)] : no abdominal mass palpated [Nail Clubbing] : no clubbing of the fingernails [Cyanosis, Localized] : no localized cyanosis [Petechial Hemorrhages (___cm)] : no petechial hemorrhages [] : no ischemic changes

## 2020-02-20 LAB
ALBUMIN SERPL ELPH-MCNC: 5.1 G/DL
ALP BLD-CCNC: 85 U/L
ALT SERPL-CCNC: 53 U/L
ANION GAP SERPL CALC-SCNC: 16 MMOL/L
AST SERPL-CCNC: 29 U/L
BASOPHILS # BLD AUTO: 0.03 K/UL
BASOPHILS NFR BLD AUTO: 0.4 %
BILIRUB SERPL-MCNC: 0.6 MG/DL
BUN SERPL-MCNC: 14 MG/DL
CALCIUM SERPL-MCNC: 10.3 MG/DL
CHLORIDE SERPL-SCNC: 99 MMOL/L
CHOLEST SERPL-MCNC: 253 MG/DL
CHOLEST/HDLC SERPL: 4.4 RATIO
CO2 SERPL-SCNC: 27 MMOL/L
CREAT SERPL-MCNC: 0.99 MG/DL
EOSINOPHIL # BLD AUTO: 0.12 K/UL
EOSINOPHIL NFR BLD AUTO: 1.6 %
ESTIMATED AVERAGE GLUCOSE: 120 MG/DL
GLUCOSE SERPL-MCNC: 113 MG/DL
HBA1C MFR BLD HPLC: 5.8 %
HCT VFR BLD CALC: 50 %
HDLC SERPL-MCNC: 57 MG/DL
HGB BLD-MCNC: 16.6 G/DL
HIV1+2 AB SPEC QL IA.RAPID: NONREACTIVE
IMM GRANULOCYTES NFR BLD AUTO: 0.4 %
LDLC SERPL CALC-MCNC: 156 MG/DL
LYMPHOCYTES # BLD AUTO: 2.45 K/UL
LYMPHOCYTES NFR BLD AUTO: 33.1 %
MAN DIFF?: NORMAL
MCHC RBC-ENTMCNC: 30 PG
MCHC RBC-ENTMCNC: 33.2 GM/DL
MCV RBC AUTO: 90.4 FL
MONOCYTES # BLD AUTO: 0.42 K/UL
MONOCYTES NFR BLD AUTO: 5.7 %
NEUTROPHILS # BLD AUTO: 4.35 K/UL
NEUTROPHILS NFR BLD AUTO: 58.8 %
PLATELET # BLD AUTO: NORMAL K/UL
POTASSIUM SERPL-SCNC: 4.4 MMOL/L
PROT SERPL-MCNC: 8 G/DL
RBC # BLD: 5.53 M/UL
RBC # FLD: 12.9 %
SODIUM SERPL-SCNC: 142 MMOL/L
TRIGL SERPL-MCNC: 198 MG/DL
WBC # FLD AUTO: 7.4 K/UL

## 2020-02-21 PROBLEM — D69.1 PLATELET DYSFUNCTION: Status: ACTIVE | Noted: 2020-02-21

## 2020-02-21 NOTE — REVIEW OF SYSTEMS
[Chest Pain] : chest pain [Shortness Of Breath] : shortness of breath [Dyspnea on Exertion] : dyspnea on exertion [Muscle Pain] : muscle pain [Back Pain] : back pain [Negative] : Psychiatric [Lower Ext Edema] : no lower extremity edema [Orthopena] : no orthopnea [Paroxysmal Nocturnal Dyspnea] : no paroxysmal nocturnal dyspnea [Wheezing] : no wheezing [Cough] : no cough [Abdominal Pain] : no abdominal pain [Nausea] : no nausea [Constipation] : no constipation [Diarrhea] : no diarrhea [Vomiting] : no vomiting [Heartburn] : no heartburn [Melena] : no melena [Joint Pain] : no joint pain [Joint Stiffness] : no joint stiffness [Muscle Weakness] : no muscle weakness [Joint Swelling] : no joint swelling

## 2020-02-21 NOTE — HISTORY OF PRESENT ILLNESS
[FreeTextEntry8] : 39 M w/ hx of obesity, MRSA abscess/cellulitis, pyelonephritis, H.pylori gastritis s/p treatment, C. diff colitis following abx use (2018) presenting with throat and chest pains. \par \par # Chest pain- Patient with 6/10 chest pain on the mid chest/ L sided chest pain that started yesterday. Pain radiated to b/l shoulders. Pain is intermittent, worse with movement. Patient also had acute shortness of breath, so he went straight to the ED. Troponin were negative and patient did not have any EKG changes. Patient's pain most likely musculoskeletal. Patient was discharged with cardiology follow up. Patient normally does not have shortness of breath, however today he had dyspnea on exertion when playing with his son. Patient currently not having active chest pain. \par \par # Throat Pain- Yesterday when patient developed chest pain, he also had some pain in the throat. He took Motrin and Tylenol which did not help. He continues to have some throat discomfort however is able to tolerate foods and drinks. Patient denies any fevers, chills, nausea, or vomiting. No cough, no rhinorrhea, no sick contacts. No history of GERD. \par \par # Back pain- 4 days ago, patient developed upper back pain, also with some burning sensation. No trauma or injury to the back. No recent weight changes, no muscle weakness.

## 2020-02-21 NOTE — PHYSICAL EXAM
[Well Nourished] : well nourished [No Acute Distress] : no acute distress [Well Developed] : well developed [Normal Outer Ear/Nose] : the outer ears and nose were normal in appearance [Normal Nasal Mucosa] : the nasal mucosa was normal [Normal Oropharynx] : the oropharynx was normal [No Lymphadenopathy] : no lymphadenopathy [No JVD] : no jugular venous distention [Thyroid Normal, No Nodules] : the thyroid was normal and there were no nodules present [Supple] : supple [No Accessory Muscle Use] : no accessory muscle use [Clear to Auscultation] : lungs were clear to auscultation bilaterally [No Respiratory Distress] : no respiratory distress  [Regular Rhythm] : with a regular rhythm [Normal Rate] : normal rate  [Pedal Pulses Present] : the pedal pulses are present [Normal S1, S2] : normal S1 and S2 [Soft] : abdomen soft [No Edema] : there was no peripheral edema [Non Tender] : non-tender [Non-distended] : non-distended [No CVA Tenderness] : no CVA  tenderness [Normal Bowel Sounds] : normal bowel sounds [No Rash] : no rash [No Spinal Tenderness] : no spinal tenderness [de-identified] : paraspinal muscle tenderness up the upper back, R>L. No masses.  [de-identified] : reproducible chest pain in the sternal region on L sided chest wall

## 2020-02-24 DIAGNOSIS — E78.5 HYPERLIPIDEMIA, UNSPECIFIED: ICD-10-CM

## 2020-02-24 DIAGNOSIS — R07.0 PAIN IN THROAT: ICD-10-CM

## 2020-02-24 DIAGNOSIS — D69.1 QUALITATIVE PLATELET DEFECTS: ICD-10-CM

## 2020-02-24 DIAGNOSIS — D69.6 THROMBOCYTOPENIA, UNSPECIFIED: ICD-10-CM

## 2020-02-24 DIAGNOSIS — E66.3 OVERWEIGHT: ICD-10-CM

## 2020-02-24 DIAGNOSIS — R07.9 CHEST PAIN, UNSPECIFIED: ICD-10-CM

## 2020-02-24 DIAGNOSIS — M54.9 DORSALGIA, UNSPECIFIED: ICD-10-CM

## 2020-03-03 PROBLEM — L02.91 CUTANEOUS ABSCESS, UNSPECIFIED: Chronic | Status: INACTIVE | Noted: 2017-10-11 | Resolved: 2020-02-17

## 2020-03-18 ENCOUNTER — OUTPATIENT (OUTPATIENT)
Dept: OUTPATIENT SERVICES | Facility: HOSPITAL | Age: 40
LOS: 1 days | Discharge: HOME | End: 2020-03-18

## 2020-03-18 DIAGNOSIS — D69.6 THROMBOCYTOPENIA, UNSPECIFIED: ICD-10-CM

## 2020-03-18 DIAGNOSIS — Z98.890 OTHER SPECIFIED POSTPROCEDURAL STATES: Chronic | ICD-10-CM

## 2020-03-19 ENCOUNTER — OUTPATIENT (OUTPATIENT)
Dept: OUTPATIENT SERVICES | Facility: HOSPITAL | Age: 40
LOS: 1 days | Discharge: HOME | End: 2020-03-19

## 2020-03-19 DIAGNOSIS — D69.6 THROMBOCYTOPENIA, UNSPECIFIED: ICD-10-CM

## 2020-03-19 DIAGNOSIS — Z98.890 OTHER SPECIFIED POSTPROCEDURAL STATES: Chronic | ICD-10-CM

## 2020-03-23 ENCOUNTER — APPOINTMENT (OUTPATIENT)
Dept: HEMATOLOGY ONCOLOGY | Facility: CLINIC | Age: 40
End: 2020-03-23

## 2020-05-19 NOTE — HISTORY OF PRESENT ILLNESS
[FreeTextEntry1] : 39M w/ hx of obesity, MRSA abscess/cellulitis, pyelonephritis, H.pylori gastritis s/p treatment, C. diff colitis following abx use (2018) presenting for eval of chest pain post emergency room visit.  Per pt, about 8 days ago he had dinner where shortly after he had epigastric discomfort and bloating.  Pt tried club soda which relieved the pain w/ belching, however developed back pain afterwards.  Since then, pt reports ongoing back/burning pain which sometimes radiates to the chest and throat.   Back/throat pain exacerbated with hot beverages, relieved with carbonated beverages and positional movements.  Pain lasts for hours but normally starts in the morning upon awakening.   Mild worsening of back pain after cleaning the house.  Pt went to the ED for further evaluation, where he was assessed for CP, had normal trop and EKG and sent to cards clinic for further eval.  ~10+ block exercise tolerance, able to ambulate a flight of stairs without issue. Chart reviewed, noted to have elevated . Pt states he has been told about this high cholesterol and is actively trying diet modifications.   Currently denies CP, palpitations, SOB, abd pain.

## 2020-05-19 NOTE — ASSESSMENT
[FreeTextEntry1] : 39M w/ hx of obesity, MRSA abscess/cellulitis, pyelonephritis, H.pylori gastritis s/p treatment, C. diff colitis following abx use (2018) presenting for eval of chest pain post emergency room visit.\par \par #chest pain\par -back pain radiating to chest after meal 8 days ago\par -likely non-cardiac, although questionable exertional component\par -recent ED visit, trop neg, EKG NSR w/ no ischemic changes\par -will obtain TTE\par -will prescribe pantoprazole given associated mild odynophagia\par -RTC 3 months, will plan for plain exercise stress test at this time for functional eval\par \par #HLD\par -, HDL 57\par -ASCVD 10y risk ~1.3%\par -extensive counseling on lifestyle modifications\par \par Angel Novoa, PGY4

## 2020-06-02 ENCOUNTER — OUTPATIENT (OUTPATIENT)
Dept: OUTPATIENT SERVICES | Facility: HOSPITAL | Age: 40
LOS: 1 days | Discharge: HOME | End: 2020-06-02

## 2020-06-02 DIAGNOSIS — Z98.890 OTHER SPECIFIED POSTPROCEDURAL STATES: Chronic | ICD-10-CM

## 2020-06-02 DIAGNOSIS — D69.6 THROMBOCYTOPENIA, UNSPECIFIED: ICD-10-CM

## 2020-06-03 ENCOUNTER — APPOINTMENT (OUTPATIENT)
Dept: CARDIOLOGY | Facility: HOSPITAL | Age: 40
End: 2020-06-03

## 2020-06-08 ENCOUNTER — APPOINTMENT (OUTPATIENT)
Dept: HEMATOLOGY ONCOLOGY | Facility: CLINIC | Age: 40
End: 2020-06-08

## 2020-06-26 ENCOUNTER — APPOINTMENT (OUTPATIENT)
Dept: OPHTHALMOLOGY | Facility: CLINIC | Age: 40
End: 2020-06-26

## 2020-10-29 ENCOUNTER — APPOINTMENT (OUTPATIENT)
Dept: INTERNAL MEDICINE | Facility: CLINIC | Age: 40
End: 2020-10-29

## 2020-11-17 ENCOUNTER — APPOINTMENT (OUTPATIENT)
Dept: INTERNAL MEDICINE | Facility: CLINIC | Age: 40
End: 2020-11-17

## 2021-01-05 ENCOUNTER — LABORATORY RESULT (OUTPATIENT)
Age: 41
End: 2021-01-05

## 2021-01-05 ENCOUNTER — APPOINTMENT (OUTPATIENT)
Dept: INTERNAL MEDICINE | Facility: CLINIC | Age: 41
End: 2021-01-05

## 2021-01-05 ENCOUNTER — OUTPATIENT (OUTPATIENT)
Dept: OUTPATIENT SERVICES | Facility: HOSPITAL | Age: 41
LOS: 1 days | End: 2021-01-05
Payer: SELF-PAY

## 2021-01-05 VITALS
WEIGHT: 195 LBS | SYSTOLIC BLOOD PRESSURE: 122 MMHG | HEART RATE: 73 BPM | BODY MASS INDEX: 29.21 KG/M2 | HEIGHT: 68.5 IN | OXYGEN SATURATION: 98 % | DIASTOLIC BLOOD PRESSURE: 70 MMHG

## 2021-01-05 DIAGNOSIS — Z86.2 PERSONAL HISTORY OF DISEASES OF THE BLOOD AND BLOOD-FORMING ORGANS AND CERTAIN DISORDERS INVOLVING THE IMMUNE MECHANISM: ICD-10-CM

## 2021-01-05 DIAGNOSIS — Z98.890 OTHER SPECIFIED POSTPROCEDURAL STATES: Chronic | ICD-10-CM

## 2021-01-05 DIAGNOSIS — N50.82 SCROTAL PAIN: ICD-10-CM

## 2021-01-05 DIAGNOSIS — Z86.69 PERSONAL HISTORY OF OTHER DISEASES OF THE NERVOUS SYSTEM AND SENSE ORGANS: ICD-10-CM

## 2021-01-05 DIAGNOSIS — R07.0 PAIN IN THROAT: ICD-10-CM

## 2021-01-05 DIAGNOSIS — Z86.018 PERSONAL HISTORY OF OTHER BENIGN NEOPLASM: ICD-10-CM

## 2021-01-05 DIAGNOSIS — I10 ESSENTIAL (PRIMARY) HYPERTENSION: ICD-10-CM

## 2021-01-05 DIAGNOSIS — N50.819 TESTICULAR PAIN, UNSPECIFIED: ICD-10-CM

## 2021-01-05 DIAGNOSIS — N45.3 EPIDIDYMO-ORCHITIS: ICD-10-CM

## 2021-01-05 DIAGNOSIS — Z87.39 PERSONAL HISTORY OF OTHER DISEASES OF THE MUSCULOSKELETAL SYSTEM AND CONNECTIVE TISSUE: ICD-10-CM

## 2021-01-05 DIAGNOSIS — K29.70 GASTRITIS, UNSPECIFIED, W/OUT BLEEDING: ICD-10-CM

## 2021-01-05 PROCEDURE — 90715 TDAP VACCINE 7 YRS/> IM: CPT

## 2021-01-05 PROCEDURE — 82150 ASSAY OF AMYLASE: CPT

## 2021-01-05 PROCEDURE — 85027 COMPLETE CBC AUTOMATED: CPT

## 2021-01-05 PROCEDURE — 90471 IMMUNIZATION ADMIN: CPT

## 2021-01-05 PROCEDURE — 85049 AUTOMATED PLATELET COUNT: CPT

## 2021-01-05 PROCEDURE — 83036 HEMOGLOBIN GLYCOSYLATED A1C: CPT

## 2021-01-05 PROCEDURE — 83690 ASSAY OF LIPASE: CPT

## 2021-01-05 PROCEDURE — 80053 COMPREHEN METABOLIC PANEL: CPT

## 2021-01-05 PROCEDURE — 80061 LIPID PANEL: CPT

## 2021-01-05 PROCEDURE — G0008: CPT

## 2021-01-05 PROCEDURE — 90688 IIV4 VACCINE SPLT 0.5 ML IM: CPT

## 2021-01-05 PROCEDURE — G0463: CPT | Mod: 25

## 2021-01-06 PROBLEM — K29.70 GASTRITIS: Status: ACTIVE | Noted: 2018-02-28

## 2021-01-06 PROBLEM — Z87.39 HISTORY OF BACK PAIN: Status: RESOLVED | Noted: 2020-02-21 | Resolved: 2021-01-06

## 2021-01-06 PROBLEM — Z86.69 HISTORY OF BLURRED VISION: Status: RESOLVED | Noted: 2018-11-09 | Resolved: 2021-01-06

## 2021-01-06 PROBLEM — N50.819 TESTICULAR PAIN: Status: RESOLVED | Noted: 2019-03-12 | Resolved: 2021-01-06

## 2021-01-06 PROBLEM — R07.0 THROAT DISCOMFORT: Status: RESOLVED | Noted: 2020-02-19 | Resolved: 2021-01-06

## 2021-01-06 PROBLEM — N45.3 EPIDIDYMOORCHITIS: Status: RESOLVED | Noted: 2019-03-12 | Resolved: 2021-01-06

## 2021-01-06 PROBLEM — N50.82 SCROTAL PAIN: Status: RESOLVED | Noted: 2019-04-15 | Resolved: 2021-01-06

## 2021-01-06 PROBLEM — Z86.2 HISTORY OF THROMBOCYTOPENIA: Status: RESOLVED | Noted: 2017-10-11 | Resolved: 2021-01-06

## 2021-01-06 PROBLEM — Z86.018: Status: RESOLVED | Noted: 2020-02-03 | Resolved: 2021-01-06

## 2021-01-06 LAB
A1C WITH ESTIMATED AVERAGE GLUCOSE RESULT: 5.7 % — HIGH (ref 4–5.6)
ALBUMIN SERPL ELPH-MCNC: 4.9 G/DL — SIGNIFICANT CHANGE UP (ref 3.3–5)
ALP SERPL-CCNC: 102 U/L — SIGNIFICANT CHANGE UP (ref 40–120)
ALT FLD-CCNC: 35 U/L — SIGNIFICANT CHANGE UP (ref 10–45)
AMYLASE P1 CFR SERPL: 99 U/L — SIGNIFICANT CHANGE UP (ref 25–125)
ANION GAP SERPL CALC-SCNC: 22 MMOL/L — HIGH (ref 5–17)
AST SERPL-CCNC: 25 U/L — SIGNIFICANT CHANGE UP (ref 10–40)
BILIRUB SERPL-MCNC: 0.4 MG/DL — SIGNIFICANT CHANGE UP (ref 0.2–1.2)
BUN SERPL-MCNC: 23 MG/DL — SIGNIFICANT CHANGE UP (ref 7–23)
CALCIUM SERPL-MCNC: 9.6 MG/DL — SIGNIFICANT CHANGE UP (ref 8.4–10.5)
CHLORIDE SERPL-SCNC: 102 MMOL/L — SIGNIFICANT CHANGE UP (ref 96–108)
CHOLEST SERPL-MCNC: 254 MG/DL — HIGH
CLOSURE TME COLL+EPINEP BLD: 229 K/UL — SIGNIFICANT CHANGE UP (ref 150–400)
CO2 SERPL-SCNC: 18 MMOL/L — LOW (ref 22–31)
CREAT SERPL-MCNC: 1.17 MG/DL — SIGNIFICANT CHANGE UP (ref 0.5–1.3)
ESTIMATED AVERAGE GLUCOSE: 117 MG/DL — HIGH (ref 68–114)
GLUCOSE SERPL-MCNC: 42 MG/DL — CRITICAL LOW (ref 70–99)
HCT VFR BLD CALC: 47.7 % — SIGNIFICANT CHANGE UP (ref 39–50)
HDLC SERPL-MCNC: 58 MG/DL — SIGNIFICANT CHANGE UP
HGB BLD-MCNC: 15.5 G/DL — SIGNIFICANT CHANGE UP (ref 13–17)
LIDOCAIN IGE QN: 32 U/L — SIGNIFICANT CHANGE UP (ref 13–60)
LIPID PNL WITH DIRECT LDL SERPL: 168 MG/DL — HIGH
MANUAL SMEAR VERIFICATION: SIGNIFICANT CHANGE UP
MCHC RBC-ENTMCNC: 29.9 PG — SIGNIFICANT CHANGE UP (ref 27–34)
MCHC RBC-ENTMCNC: 32.5 GM/DL — SIGNIFICANT CHANGE UP (ref 32–36)
MCV RBC AUTO: 91.9 FL — SIGNIFICANT CHANGE UP (ref 80–100)
NON HDL CHOLESTEROL: 196 MG/DL — HIGH
PLAT MORPH BLD: SIGNIFICANT CHANGE UP
PLATELET # BLD AUTO: SIGNIFICANT CHANGE UP K/UL (ref 150–400)
POTASSIUM SERPL-MCNC: 4.5 MMOL/L — SIGNIFICANT CHANGE UP (ref 3.5–5.3)
POTASSIUM SERPL-SCNC: 4.5 MMOL/L — SIGNIFICANT CHANGE UP (ref 3.5–5.3)
PROT SERPL-MCNC: 7.7 G/DL — SIGNIFICANT CHANGE UP (ref 6–8.3)
RBC # BLD: 5.19 M/UL — SIGNIFICANT CHANGE UP (ref 4.2–5.8)
RBC # FLD: 12.9 % — SIGNIFICANT CHANGE UP (ref 10.3–14.5)
RBC BLD AUTO: SIGNIFICANT CHANGE UP
SODIUM SERPL-SCNC: 142 MMOL/L — SIGNIFICANT CHANGE UP (ref 135–145)
TRIGL SERPL-MCNC: 139 MG/DL — SIGNIFICANT CHANGE UP
WBC # BLD: 6.91 K/UL — SIGNIFICANT CHANGE UP (ref 3.8–10.5)
WBC # FLD AUTO: 6.91 K/UL — SIGNIFICANT CHANGE UP (ref 3.8–10.5)

## 2021-01-06 RX ORDER — PANTOPRAZOLE 40 MG/1
40 TABLET, DELAYED RELEASE ORAL DAILY
Qty: 45 | Refills: 0 | Status: DISCONTINUED | COMMUNITY
Start: 2020-02-19 | End: 2021-01-06

## 2021-01-06 RX ORDER — HYDROCORTISONE 1 %
0.2-0.2-1 AEROSOL, SPRAY (ML) TOPICAL
Qty: 1 | Refills: 0 | Status: DISCONTINUED | COMMUNITY
Start: 2020-01-17 | End: 2021-01-06

## 2021-01-07 DIAGNOSIS — K29.70 GASTRITIS, UNSPECIFIED, WITHOUT BLEEDING: ICD-10-CM

## 2021-01-07 DIAGNOSIS — F17.200 NICOTINE DEPENDENCE, UNSPECIFIED, UNCOMPLICATED: ICD-10-CM

## 2021-01-07 DIAGNOSIS — Z23 ENCOUNTER FOR IMMUNIZATION: ICD-10-CM

## 2021-01-07 DIAGNOSIS — Z00.00 ENCOUNTER FOR GENERAL ADULT MEDICAL EXAMINATION WITHOUT ABNORMAL FINDINGS: ICD-10-CM

## 2021-01-07 NOTE — PHYSICAL EXAM
[No Acute Distress] : no acute distress [Well Nourished] : well nourished [Well Developed] : well developed [Well-Appearing] : well-appearing [Normal Sclera/Conjunctiva] : normal sclera/conjunctiva [PERRL] : pupils equal round and reactive to light [EOMI] : extraocular movements intact [Normal Outer Ear/Nose] : the outer ears and nose were normal in appearance [Normal Oropharynx] : the oropharynx was normal [No JVD] : no jugular venous distention [No Lymphadenopathy] : no lymphadenopathy [Supple] : supple [Thyroid Normal, No Nodules] : the thyroid was normal and there were no nodules present [No Respiratory Distress] : no respiratory distress  [No Accessory Muscle Use] : no accessory muscle use [Clear to Auscultation] : lungs were clear to auscultation bilaterally [Normal Rate] : normal rate  [Regular Rhythm] : with a regular rhythm [Normal S1, S2] : normal S1 and S2 [No Murmur] : no murmur heard [No Carotid Bruits] : no carotid bruits [No Abdominal Bruit] : a ~M bruit was not heard ~T in the abdomen [No Varicosities] : no varicosities [Pedal Pulses Present] : the pedal pulses are present [No Edema] : there was no peripheral edema [No Palpable Aorta] : no palpable aorta [No Extremity Clubbing/Cyanosis] : no extremity clubbing/cyanosis [Soft] : abdomen soft [Non-distended] : non-distended [No Masses] : no abdominal mass palpated [No HSM] : no HSM [Normal Bowel Sounds] : normal bowel sounds [Normal Posterior Cervical Nodes] : no posterior cervical lymphadenopathy [Normal Anterior Cervical Nodes] : no anterior cervical lymphadenopathy [No CVA Tenderness] : no CVA  tenderness [No Spinal Tenderness] : no spinal tenderness [No Joint Swelling] : no joint swelling [Grossly Normal Strength/Tone] : grossly normal strength/tone [No Rash] : no rash [Coordination Grossly Intact] : coordination grossly intact [No Focal Deficits] : no focal deficits [Normal Gait] : normal gait [Normal Affect] : the affect was normal [Normal Insight/Judgement] : insight and judgment were intact [de-identified] : Mild epigastric TTP w/ out rebound or guarding noted

## 2021-01-07 NOTE — HEALTH RISK ASSESSMENT
Speech Therapy    Swallow Recommendations:  1. General solids/thin liquids, as tolerated. Periodic supervision.   2. Medications- 1-2 at a time whole with liquids or puree  3. Slow rate, double swallow solids/liquids, alternate solids/liquids  4. D/C if increased coughing, throat clears, shortness of breath.     Visit type: evaluation    Note type: clinical swallow  Precautions     Medical precautions:  N95, face shield, gown/gloves, hair net    SUBJECTIVE                                                                                                               Patient seen in the ICU for swallow evaluation. Per RN, patient with prolonged coughing following medication administration night prior- patient unaware/unable to recall event; no difficulty with medication administration this date. Per chart review, temperatures/lungs stable.      Patient goal / family goal: return to previous functional status     OBJECTIVE                                                                                                                Swallowing   Observation     Temperature Kleber notes: No    Volitional swallow: present  Consistencies     Thin Liquid (tsp): Type: snack  Oral:  Impaired  Impaired bolus control and suspect premature spillage  Pharyngeal:  Impaired  Suspect decreased hyolaryngeal elevation and suspect delayed swallow response    Thin Liquid (straw):  Amount given (oz):  3  Oral:  Impaired  Impaired bolus control and suspect premature spillage  Pharyngeal:  Impaired  Suspect delayed swallow response, suspect decreased hyolaryngeal elevation and delayed cough    Dysphagia 2/Ground Meat: Type: meal  Oral:  Impaired  Impaired bolus control and suspect premature spillage  Pharyngeal:  Impaired  Suspect delayed swallow response and suspect decreased hyolaryngeal elevation    General Consistency: Type: meal  Oral:  Impaired  Impaired bolus control and suspect premature spillage  Pharyngeal:  Impaired  Immediate  throat clear, suspect delayed swallow response and suspect decreased hyolaryngeal elevation  Esophageal symptoms     Symptoms: not present    Pain associated with swallow: No               ASSESSMENT                                                                                                                 Patient agreeable to minimal intake- patient reports decreased appetite, food tasting bland since starting chemotherapy. Dentures present. Patient does not endorse any pain or globus sensation with swallowing. Does report new cough with intake (responses to questions regarding current coughing during meals varied).     Patient provided with thin liquids (tsp/straw), ground/minced solids (pears) and general solids (cream of chicken soup); patient declined intake of sandwich. Patient demonstrated delayed cough x 1 with thin liquids via straw (consecutive sips) and immediate throat clear x 3 with soup (wet; throat clear appeared effective in clearing possible residuals). O2 remained stable at 100% throughout intake.     Oral phase characterized by decreased bolus control/coordination and possible premature spillage. Swallow initiation appears delayed with reduced/sluggish hyolaryngeal movement to palpation. Recommend patient continue with general solids and thin liquids with adherence to swallow guidelines. RN aware.     Appetite poor- suggest RD consult.    Speech therapy to follow for diet tolerance/safety, patient education and monitor need for VFSS.   Will also monitor need for comm/cog evaluation (would require MD order for same), however, suspect mild cognitive deficits as patient demonstrated difficulty with recall of recent events and difficulty answering writer's questions/requiring additional processing time.     Requires SLP Follow Up: Yes    Discharge Recommendations           SLP Identified Barriers to Discharge: TBD   Recommendations for Discharge: SLP: TBELAINE, pending progress        Skilled therapy  is required to address these limitations in attempt to maximize the patient's independence.  PLAN                                                                                                                                 Suggestions for next Therapy Session:  Frequency: SAT (9/4) 0/4 on 9/11/20; swallow, comm/cog eval?- need MD order    Speech therapy to follow for diet tolerance/safety, patient education and monitor need for VFSS.   Will also monitor need for comm/cog evaluation (would require MD order for same), however, suspect mild cognitive deficits as patient demonstrated difficulty with recall of recent events and difficulty answering writer's questions/requiring additional processing time.       Interventions:  Assess tolerance of least restrictive oral diet, dysphagia therapy and communication/cognition evaluation    Plan/Goal Agreement:  Patient agrees with goals and treatment plan      Recommendations     -Diet:          *general and thin liquids    -Medication Administration:         *whole, with liquids and with puree    -Compensatory Strategies:         *double swallow liquids and double swallow solids    -Feeding Guidelines:          *alternate solids/liquids, feeds self, periodic/intermittent supervision, sit up for 30 minutes after meal, stop feeding if coughing observed, small bites/sips and eat slowly only when alert    -Speech Reviewed Swallow:         *with patient/family, with clinical caregivers and feeding guidelines posted in room    GOALS:  Long Term Goals: Swallow: Patient will tolerate a general diet and thin liquids without complications of aspiration (9/11/20).   Documented in the chart in the following areas: Assessment.      Nicki Winchester M.S., [facility]-SLP  Speech Pathologist  Pager: [phone]  Group speech pager: [phone]   [Very Good] : ~his/her~  mood as very good [] : Yes [16-20] : 16-20 [Yes] : Yes [Monthly or less (1 pt)] : Monthly or less (1 point) [1 or 2 (0 pts)] : 1 or 2 (0 points) [Never (0 pts)] : Never (0 points) [No] : In the past 12 months have you used drugs other than those required for medical reasons? No [No falls in past year] : Patient reported no falls in the past year [0] : 2) Feeling down, depressed, or hopeless: Not at all (0) [None] : None [With Family] : lives with family [Employed] : employed [] :  [# Of Children ___] : has [unfilled] children [Feels Safe at Home] : Feels safe at home [Fully functional (bathing, dressing, toileting, transferring, walking, feeding)] : Fully functional (bathing, dressing, toileting, transferring, walking, feeding) [Fully functional (using the telephone, shopping, preparing meals, housekeeping, doing laundry, using] : Fully functional and needs no help or supervision to perform IADLs (using the telephone, shopping, preparing meals, housekeeping, doing laundry, using transportation, managing medications and managing finances) [Audit-CScore] : 1 [[id number]] : 0 [Change in mental status noted] : No change in mental status noted [Reports changes in hearing] : Reports no changes in hearing [Reports changes in vision] : Reports no changes in vision [Reports changes in dental health] : Reports no changes in dental health [HIVDate] : 01/20 [HepatitisCDate] : 07/11 [FreeTextEntry2] :

## 2021-01-07 NOTE — ASSESSMENT
[FreeTextEntry1] : Pt is a 41yo M w/ PMH of H pylori gastritis, HLD, MRSA Cellulitis, prostatitis, c diff in 2018 presenting for CPE\par \par # HCM\par - Will administer Flu and Tdap vaccinations today\par - Order CBC, CMP, A1c, lipid profile, and blue top as pt w/ clotted plts on previous comprehensive \par - Will need pneumovax at next encounter as pt is active smoker\par \par # Smoking cessation \par - Currently smokes 2 cigarettes/day for the past 6 weeks as compared to 1ppd for 18 yrs\par - Wanting to quit \par - Will consult w/ SBIRT and encourage pt to decrease the last 2 cigarettes by utilizing lozenges (as he's been doing w/ help)\par \par # Abd pain\par - Epigastric abd pain that is alleviated w/ Pepcid and worse post-prandial w/ no radiation and no N/V/Constipation/Diarrhea, melena or hematochezia making cause as likely gastritis \par - Advised to c/w Pepcid\par - Lifestyle modifications w/ avoiding spicy, acidic foods. Avoid eating too close to bedtime, sit upright for 30 minutes after eating and to avoid NSAIDs\par - Advised to return to clinic or follow up in hospital if symptoms worsen such as severe abd pain or begins to develop N/V\par - Will order CMP, A1c, lipid profile, amylase and lipase \par - If pain worsens or concerning labs can consider ultrasound abdomen to further evaluate \par - Will hold on ordering H pylori now in setting of acid suppressive medication\par \par Return to clinic in 5 weeks to reassess abd pain\par \par Case d/w Dr. Euceda \par

## 2021-01-07 NOTE — REVIEW OF SYSTEMS
[Abdominal Pain] : abdominal pain [Heartburn] : heartburn [Negative] : Heme/Lymph [Nausea] : no nausea [Constipation] : no constipation [Diarrhea] : no diarrhea [Vomiting] : no vomiting [Melena] : no melena [FreeTextEntry7] : Epigastric

## 2021-01-07 NOTE — HISTORY OF PRESENT ILLNESS
[FreeTextEntry1] : CPE [de-identified] : Pt is a 39yo M w/ PMH of H pylori gastritis, HLD, MRSA Cellulitis, prostatitis, c diff in 2018 presenting for CPE. \par \par His only concern during today's visit is epigastric abdominal pain that is similar to previous gastritis pain. Describes it as a gnawing type of pain that is nonradiating and worse w/ food. States over the past week he has decreased spicy food, coffee, and orange juice in his diet which have helped improve the symptoms. He then started taking Pepcid over the past three days w/ near resolution of the symptoms. He denies any NSAID use, was previously on Protonix 40mg QD but no longer taking it. \par \par HCM: He is UTD on everything but influenza and Tdap vaccines. Due for Pneumovax in setting of being active smoker

## 2021-01-12 ENCOUNTER — NON-APPOINTMENT (OUTPATIENT)
Age: 41
End: 2021-01-12

## 2021-02-08 ENCOUNTER — APPOINTMENT (OUTPATIENT)
Dept: INTERNAL MEDICINE | Facility: CLINIC | Age: 41
End: 2021-02-08

## 2022-02-16 ENCOUNTER — NON-APPOINTMENT (OUTPATIENT)
Age: 42
End: 2022-02-16

## 2022-02-16 ENCOUNTER — OUTPATIENT (OUTPATIENT)
Dept: OUTPATIENT SERVICES | Facility: HOSPITAL | Age: 42
LOS: 1 days | End: 2022-02-16
Payer: SELF-PAY

## 2022-02-16 ENCOUNTER — LABORATORY RESULT (OUTPATIENT)
Age: 42
End: 2022-02-16

## 2022-02-16 ENCOUNTER — APPOINTMENT (OUTPATIENT)
Dept: INTERNAL MEDICINE | Facility: CLINIC | Age: 42
End: 2022-02-16
Payer: COMMERCIAL

## 2022-02-16 VITALS
WEIGHT: 195 LBS | OXYGEN SATURATION: 98 % | HEIGHT: 68.5 IN | BODY MASS INDEX: 29.21 KG/M2 | DIASTOLIC BLOOD PRESSURE: 76 MMHG | HEART RATE: 68 BPM | SYSTOLIC BLOOD PRESSURE: 116 MMHG

## 2022-02-16 DIAGNOSIS — Z23 ENCOUNTER FOR IMMUNIZATION: ICD-10-CM

## 2022-02-16 DIAGNOSIS — Z98.890 OTHER SPECIFIED POSTPROCEDURAL STATES: Chronic | ICD-10-CM

## 2022-02-16 DIAGNOSIS — H11.009 UNSPECIFIED PTERYGIUM OF UNSPECIFIED EYE: ICD-10-CM

## 2022-02-16 DIAGNOSIS — R42 DIZZINESS AND GIDDINESS: ICD-10-CM

## 2022-02-16 DIAGNOSIS — I10 ESSENTIAL (PRIMARY) HYPERTENSION: ICD-10-CM

## 2022-02-16 PROCEDURE — G0463: CPT | Mod: 25

## 2022-02-16 PROCEDURE — 93005 ELECTROCARDIOGRAM TRACING: CPT

## 2022-02-16 PROCEDURE — ZZZZZ: CPT

## 2022-02-16 PROCEDURE — G0008: CPT

## 2022-02-16 PROCEDURE — 87389 HIV-1 AG W/HIV-1&-2 AB AG IA: CPT

## 2022-02-16 PROCEDURE — T1013: CPT

## 2022-02-16 PROCEDURE — 85025 COMPLETE CBC W/AUTO DIFF WBC: CPT

## 2022-02-16 PROCEDURE — 80061 LIPID PANEL: CPT

## 2022-02-16 PROCEDURE — 83036 HEMOGLOBIN GLYCOSYLATED A1C: CPT

## 2022-02-16 PROCEDURE — 80074 ACUTE HEPATITIS PANEL: CPT

## 2022-02-16 PROCEDURE — 87591 N.GONORRHOEAE DNA AMP PROB: CPT

## 2022-02-16 PROCEDURE — 90688 IIV4 VACCINE SPLT 0.5 ML IM: CPT

## 2022-02-16 PROCEDURE — 86780 TREPONEMA PALLIDUM: CPT

## 2022-02-16 PROCEDURE — 80053 COMPREHEN METABOLIC PANEL: CPT

## 2022-02-17 LAB
ALBUMIN SERPL ELPH-MCNC: 4.7 G/DL
ALP BLD-CCNC: 84 U/L
ALT SERPL-CCNC: 36 U/L
ANION GAP SERPL CALC-SCNC: 15 MMOL/L
AST SERPL-CCNC: 21 U/L
BASOPHILS # BLD AUTO: 0.04 K/UL
BASOPHILS NFR BLD AUTO: 0.5 %
BILIRUB SERPL-MCNC: 0.3 MG/DL
BUN SERPL-MCNC: 16 MG/DL
CALCIUM SERPL-MCNC: 9.6 MG/DL
CHLORIDE SERPL-SCNC: 105 MMOL/L
CHOLEST SERPL-MCNC: 252 MG/DL
CO2 SERPL-SCNC: 23 MMOL/L
CREAT SERPL-MCNC: 0.94 MG/DL
EOSINOPHIL # BLD AUTO: 0.24 K/UL
EOSINOPHIL NFR BLD AUTO: 3.2 %
ESTIMATED AVERAGE GLUCOSE: 123 MG/DL
GLUCOSE SERPL-MCNC: 92 MG/DL
HAV IGM SER QL: NONREACTIVE
HBA1C MFR BLD HPLC: 5.9 %
HBV CORE IGM SER QL: NONREACTIVE
HBV SURFACE AG SER QL: NONREACTIVE
HCT VFR BLD CALC: 48.8 %
HCV AB SER QL: NONREACTIVE
HCV S/CO RATIO: 0.11 S/CO
HDLC SERPL-MCNC: 53 MG/DL
HGB BLD-MCNC: 15.8 G/DL
HIV1+2 AB SPEC QL IA.RAPID: NONREACTIVE
IMM GRANULOCYTES NFR BLD AUTO: 0.5 %
LDLC SERPL CALC-MCNC: 150 MG/DL
LYMPHOCYTES # BLD AUTO: 2.19 K/UL
LYMPHOCYTES NFR BLD AUTO: 29.2 %
MAN DIFF?: NORMAL
MCHC RBC-ENTMCNC: 29.4 PG
MCHC RBC-ENTMCNC: 32.4 GM/DL
MCV RBC AUTO: 90.7 FL
MONOCYTES # BLD AUTO: 0.58 K/UL
MONOCYTES NFR BLD AUTO: 7.7 %
NEUTROPHILS # BLD AUTO: 4.4 K/UL
NEUTROPHILS NFR BLD AUTO: 58.9 %
NONHDLC SERPL-MCNC: 199 MG/DL
PLATELET # BLD AUTO: NORMAL K/UL
POTASSIUM SERPL-SCNC: 4.3 MMOL/L
PROT SERPL-MCNC: 7.3 G/DL
RBC # BLD: 5.38 M/UL
RBC # FLD: 12.7 %
SODIUM SERPL-SCNC: 142 MMOL/L
T PALLIDUM AB SER QL IA: NEGATIVE
TRIGL SERPL-MCNC: 242 MG/DL
WBC # FLD AUTO: 7.49 K/UL

## 2022-02-17 RX ORDER — ATORVASTATIN CALCIUM 40 MG/1
40 TABLET, FILM COATED ORAL
Qty: 30 | Refills: 0 | Status: DISCONTINUED | COMMUNITY
Start: 2022-02-17 | End: 2022-02-17

## 2022-02-18 LAB
N GONORRHOEA RRNA SPEC QL NAA+PROBE: NOT DETECTED
SOURCE AMPLIFICATION: NORMAL

## 2022-02-20 NOTE — ASSESSMENT
[FreeTextEntry1] : Follow up with me in 5 weeks after receiving TTE\par \par case dw Dr. Macias\par \par \par Favio Hall, pgy 3

## 2022-02-20 NOTE — END OF VISIT
[] : Resident [FreeTextEntry3] : Atypical CP - varies with position and reproducible - suspect costochondritis however ECHO prudent (lee after recent COVID vaccine) - then f/u.  ED for any acute persistent pain. \par Intermittent vertigo -  present for many years on and off - neuro exam unremarkable.

## 2022-02-20 NOTE — HEALTH RISK ASSESSMENT
[Good] : ~his/her~  mood as  good [Former] : Former [20 or more] : 20 or more [Yes] : Yes [Monthly or less (1 pt)] : Monthly or less (1 point) [No falls in past year] : Patient reported no falls in the past year [0] : 2) Feeling down, depressed, or hopeless: Not at all (0) [HIV Test offered] : HIV Test offered [With Significant Other] : lives with significant other [Employed] : employed [Sexually Active] : sexually active [Feels Safe at Home] : Feels safe at home [de-identified] : smoked 1 ppd 7 months ago, but before that it had been 2 years.  [de-identified] : at work. plays soccer  [YearQuit] : 2020

## 2022-02-20 NOTE — PHYSICAL EXAM
[No Acute Distress] : no acute distress [Well Nourished] : well nourished [Well Developed] : well developed [Normal Sclera/Conjunctiva] : normal sclera/conjunctiva [PERRL] : pupils equal round and reactive to light [EOMI] : extraocular movements intact [Normal Outer Ear/Nose] : the outer ears and nose were normal in appearance [Normal Oropharynx] : the oropharynx was normal [No JVD] : no jugular venous distention [No Lymphadenopathy] : no lymphadenopathy [Supple] : supple [Thyroid Normal, No Nodules] : the thyroid was normal and there were no nodules present [No Respiratory Distress] : no respiratory distress  [No Accessory Muscle Use] : no accessory muscle use [Clear to Auscultation] : lungs were clear to auscultation bilaterally [Normal Rate] : normal rate  [Regular Rhythm] : with a regular rhythm [Normal S1, S2] : normal S1 and S2 [Soft] : abdomen soft [Non Tender] : non-tender [Non-distended] : non-distended [No HSM] : no HSM [Normal Bowel Sounds] : normal bowel sounds [Normal Posterior Cervical Nodes] : no posterior cervical lymphadenopathy [Normal Anterior Cervical Nodes] : no anterior cervical lymphadenopathy [Coordination Grossly Intact] : coordination grossly intact [No Focal Deficits] : no focal deficits [Normal Gait] : normal gait [Normal Affect] : the affect was normal [Normal Insight/Judgement] : insight and judgment were intact [de-identified] : tender to palpation in left upper chest area, mid axillar in line with areola  [de-identified] : No nystagmus, negative isidra hallpike

## 2022-02-23 DIAGNOSIS — R42 DIZZINESS AND GIDDINESS: ICD-10-CM

## 2022-02-23 DIAGNOSIS — R07.9 CHEST PAIN, UNSPECIFIED: ICD-10-CM

## 2022-02-23 DIAGNOSIS — H11.009 UNSPECIFIED PTERYGIUM OF UNSPECIFIED EYE: ICD-10-CM

## 2022-02-23 DIAGNOSIS — F17.200 NICOTINE DEPENDENCE, UNSPECIFIED, UNCOMPLICATED: ICD-10-CM

## 2022-02-23 DIAGNOSIS — E78.5 HYPERLIPIDEMIA, UNSPECIFIED: ICD-10-CM

## 2022-02-23 DIAGNOSIS — Z23 ENCOUNTER FOR IMMUNIZATION: ICD-10-CM

## 2022-02-25 ENCOUNTER — OUTPATIENT (OUTPATIENT)
Dept: OUTPATIENT SERVICES | Facility: HOSPITAL | Age: 42
LOS: 1 days | End: 2022-02-25
Payer: SELF-PAY

## 2022-02-25 ENCOUNTER — APPOINTMENT (OUTPATIENT)
Dept: CV DIAGNOSITCS | Facility: HOSPITAL | Age: 42
End: 2022-02-25
Payer: COMMERCIAL

## 2022-02-25 DIAGNOSIS — Z23 ENCOUNTER FOR IMMUNIZATION: ICD-10-CM

## 2022-02-25 DIAGNOSIS — Z98.890 OTHER SPECIFIED POSTPROCEDURAL STATES: Chronic | ICD-10-CM

## 2022-02-25 DIAGNOSIS — E78.5 HYPERLIPIDEMIA, UNSPECIFIED: ICD-10-CM

## 2022-02-25 DIAGNOSIS — R42 DIZZINESS AND GIDDINESS: ICD-10-CM

## 2022-02-25 DIAGNOSIS — F17.200 NICOTINE DEPENDENCE, UNSPECIFIED, UNCOMPLICATED: ICD-10-CM

## 2022-02-25 DIAGNOSIS — H11.009 UNSPECIFIED PTERYGIUM OF UNSPECIFIED EYE: ICD-10-CM

## 2022-02-25 DIAGNOSIS — R07.9 CHEST PAIN, UNSPECIFIED: ICD-10-CM

## 2022-02-25 PROCEDURE — 93306 TTE W/DOPPLER COMPLETE: CPT

## 2022-02-25 PROCEDURE — 93306 TTE W/DOPPLER COMPLETE: CPT | Mod: 26

## 2022-03-02 ENCOUNTER — NON-APPOINTMENT (OUTPATIENT)
Age: 42
End: 2022-03-02

## 2022-03-16 ENCOUNTER — OUTPATIENT (OUTPATIENT)
Dept: OUTPATIENT SERVICES | Facility: HOSPITAL | Age: 42
LOS: 1 days | End: 2022-03-16
Payer: SELF-PAY

## 2022-03-16 ENCOUNTER — APPOINTMENT (OUTPATIENT)
Dept: CARDIOLOGY | Facility: HOSPITAL | Age: 42
End: 2022-03-16

## 2022-03-16 VITALS
OXYGEN SATURATION: 97 % | HEIGHT: 68.5 IN | BODY MASS INDEX: 29.07 KG/M2 | WEIGHT: 194 LBS | DIASTOLIC BLOOD PRESSURE: 79 MMHG | HEART RATE: 105 BPM | SYSTOLIC BLOOD PRESSURE: 120 MMHG

## 2022-03-16 DIAGNOSIS — Z98.890 OTHER SPECIFIED POSTPROCEDURAL STATES: Chronic | ICD-10-CM

## 2022-03-16 DIAGNOSIS — E78.5 HYPERLIPIDEMIA, UNSPECIFIED: ICD-10-CM

## 2022-03-16 DIAGNOSIS — I25.10 ATHEROSCLEROTIC HEART DISEASE OF NATIVE CORONARY ARTERY WITHOUT ANGINA PECTORIS: ICD-10-CM

## 2022-03-16 DIAGNOSIS — R06.00 DYSPNEA, UNSPECIFIED: ICD-10-CM

## 2022-03-16 PROCEDURE — 93005 ELECTROCARDIOGRAM TRACING: CPT

## 2022-03-16 PROCEDURE — G0463: CPT

## 2022-03-21 ENCOUNTER — NON-APPOINTMENT (OUTPATIENT)
Age: 42
End: 2022-03-21

## 2022-03-21 DIAGNOSIS — E78.5 HYPERLIPIDEMIA, UNSPECIFIED: ICD-10-CM

## 2022-03-21 DIAGNOSIS — R06.00 DYSPNEA, UNSPECIFIED: ICD-10-CM

## 2022-03-21 DIAGNOSIS — R07.9 CHEST PAIN, UNSPECIFIED: ICD-10-CM

## 2022-03-21 NOTE — HISTORY OF PRESENT ILLNESS
[FreeTextEntry1] : 41m hx of obesity MRSA abscess/cellulitis, pyelonephritis, H.pylori gastritis s/p treatment, C. diff colitis following abx use (2018) here for follow up.  Pt last seen 2/2020.  SInce then, he reports an episode of chest pressure that occurred 2 months ago that lasted for a few minutes, episodic for a few days.  He feels it occurred after some heavy lifting. Had resolved after a few days, has not recurred.  Does report exertional dyspnea, mild worsening over the last 2 years, particularly noticeable at top of one flight of stairs.  He was seen by medicine in the interim who obtained a TTE that was unremarkable. Otherwise, no other complaints. Currently denies CP, palpitations, SOB, abd pain.  Chart reviewed, 2/2022 lipid panel , trig 242, HDL 53, total chol 252, a1c 5.9%.

## 2022-03-21 NOTE — ASSESSMENT
[FreeTextEntry1] : 41M hx of obesity, obesity, MRSA abscess/cellulitis, pyelonephritis, H.pylori gastritis s/p treatment, C. diff colitis following abx use (2018), HLD, here for follow up\par \par #dyspnea on exertion\par -w/ atypical chest pain\par -progressive over the last year, ~1flight exercise tolerance\par -TTE reviewed, wnl function and w/o sig valve abnl\par -higher suspicion deconditioning as pt has been more sedentary\par -will check CT coronary to assess for CAD / guide statin therapy\par \par #HLD\par  , trig 242, HDL 53, total chol 252, a1c 5.9%.  \par -ASCVD 10y risk ~1.5%\par -extensive counseling on lifestyle modifications, will base decision on therapy initiation on CT coronary as well\par \par Angel Novoa MD

## 2022-04-05 ENCOUNTER — APPOINTMENT (OUTPATIENT)
Dept: OPHTHALMOLOGY | Facility: CLINIC | Age: 42
End: 2022-04-05
Payer: COMMERCIAL

## 2022-04-05 ENCOUNTER — NON-APPOINTMENT (OUTPATIENT)
Age: 42
End: 2022-04-05

## 2022-04-05 ENCOUNTER — APPOINTMENT (OUTPATIENT)
Dept: INTERNAL MEDICINE | Facility: CLINIC | Age: 42
End: 2022-04-05

## 2022-04-05 PROCEDURE — 92025 CPTRIZED CORNEAL TOPOGRAPHY: CPT

## 2022-04-05 PROCEDURE — 92004 COMPRE OPH EXAM NEW PT 1/>: CPT

## 2022-04-08 ENCOUNTER — OUTPATIENT (OUTPATIENT)
Dept: OUTPATIENT SERVICES | Facility: HOSPITAL | Age: 42
LOS: 1 days | End: 2022-04-08
Payer: COMMERCIAL

## 2022-04-08 ENCOUNTER — APPOINTMENT (OUTPATIENT)
Dept: CT IMAGING | Facility: CLINIC | Age: 42
End: 2022-04-08
Payer: COMMERCIAL

## 2022-04-08 DIAGNOSIS — Z98.890 OTHER SPECIFIED POSTPROCEDURAL STATES: Chronic | ICD-10-CM

## 2022-04-08 DIAGNOSIS — R06.00 DYSPNEA, UNSPECIFIED: ICD-10-CM

## 2022-04-08 DIAGNOSIS — E78.5 HYPERLIPIDEMIA, UNSPECIFIED: ICD-10-CM

## 2022-04-08 DIAGNOSIS — R07.9 CHEST PAIN, UNSPECIFIED: ICD-10-CM

## 2022-04-08 PROCEDURE — 75574 CT ANGIO HRT W/3D IMAGE: CPT

## 2022-04-08 PROCEDURE — 75574 CT ANGIO HRT W/3D IMAGE: CPT | Mod: 26

## 2022-04-18 NOTE — PATIENT PROFILE ADULT. - NSALCOHOLLASTUSE_GEN_A_CORE_DT
no hematuria/no flank pain L/no flank pain R/no bladder infections/no incontinence/no dysuria
12-May-2017

## 2022-04-29 ENCOUNTER — OUTPATIENT (OUTPATIENT)
Dept: OUTPATIENT SERVICES | Facility: HOSPITAL | Age: 42
LOS: 1 days | End: 2022-04-29
Payer: SELF-PAY

## 2022-04-29 ENCOUNTER — APPOINTMENT (OUTPATIENT)
Dept: INTERNAL MEDICINE | Facility: CLINIC | Age: 42
End: 2022-04-29
Payer: COMMERCIAL

## 2022-04-29 VITALS
HEART RATE: 102 BPM | HEIGHT: 68.5 IN | WEIGHT: 187 LBS | BODY MASS INDEX: 28.02 KG/M2 | DIASTOLIC BLOOD PRESSURE: 74 MMHG | SYSTOLIC BLOOD PRESSURE: 130 MMHG | OXYGEN SATURATION: 96 %

## 2022-04-29 DIAGNOSIS — S39.012A STRAIN OF MUSCLE, FASCIA AND TENDON OF LOWER BACK, INITIAL ENCOUNTER: ICD-10-CM

## 2022-04-29 DIAGNOSIS — Z98.890 OTHER SPECIFIED POSTPROCEDURAL STATES: Chronic | ICD-10-CM

## 2022-04-29 DIAGNOSIS — R07.9 CHEST PAIN, UNSPECIFIED: ICD-10-CM

## 2022-04-29 DIAGNOSIS — I10 ESSENTIAL (PRIMARY) HYPERTENSION: ICD-10-CM

## 2022-04-29 PROCEDURE — 99213 OFFICE O/P EST LOW 20 MIN: CPT | Mod: GE

## 2022-04-29 PROCEDURE — G0463: CPT

## 2022-04-29 RX ORDER — ATORVASTATIN CALCIUM 20 MG/1
20 TABLET, FILM COATED ORAL
Qty: 30 | Refills: 0 | Status: DISCONTINUED | COMMUNITY
Start: 2022-02-17 | End: 2022-04-29

## 2022-04-29 NOTE — HISTORY OF PRESENT ILLNESS
[FreeTextEntry1] : follow up for chest pain [de-identified] : 41 y/o man w hx H pylori, MRSA cellulitis, prostatitis, cdiff here for follow up for coronary calcium results. \par Last here in feb for atypical chest pain and was rec TTE and follow up with cardiology. Saw cards in March, he had normal TTE, cardiology thought deconditioning and was rec CT coronary to assess for CAD and to guide statin therapy. \par CT angio with calcium score 0. Discussed with patient likely does not need statin, follow up with cardiology. \par \par #lower back pain 3 weeks\par - lower back, left side paraspinal pain\par - lifting something heavy at work, and this started the pain\par - No weakness in legs, no incontinence of stool/urine, no numbness/tingling in extremities\par - discussed this will resolve over time and to try baclofen as needed, continue exercises an daily activities as tolerated \par \par

## 2022-04-29 NOTE — PHYSICAL EXAM
[Clear to Auscultation] : lungs were clear to auscultation bilaterally [Normal Rate] : normal rate  [Regular Rhythm] : with a regular rhythm [No Murmur] : no murmur heard [Normal] : normal gait, coordination grossly intact, no focal deficits and deep tendon reflexes were 2+ and symmetric [de-identified] : mild tenderness to palpation paraspinal on lower left side. no tender points on spine.  [de-identified] : no focal neuro deficits

## 2022-04-29 NOTE — ASSESSMENT
[FreeTextEntry1] : 43 y/o man w hx H pylori, MRSA cellulitis, prostatitis, cdiff here for follow up for coronary calcium results. \par \par #chest pain\par Last here in feb for atypical chest pain and was rec TTE and follow up with cardiology\par - TTE was wnl\par - CT angio with calcium score 0. no obstructive disease. Discussed with patient likely does not need statin (he has not been taking any statins thus far), follow up with cardiology. \par - denies any further chest pain, trying to become more physically active\par \par \par #lower back pain 3 weeks\par - lower back, left side paraspinal tenderness \par - lifting something heavy at work, and this started the pain 3 weeks ago\par - No weakness in legs, no incontinence of stool/urine, no numbness/tingling in extremities. no fevers/chills less likely infectious/cauda equina. \par - discussed this will resolve over time and to try baclofen as needed, continue exercises an daily activities as tolerated

## 2022-05-05 DIAGNOSIS — R07.9 CHEST PAIN, UNSPECIFIED: ICD-10-CM

## 2022-05-05 DIAGNOSIS — S39.012A STRAIN OF MUSCLE, FASCIA AND TENDON OF LOWER BACK, INITIAL ENCOUNTER: ICD-10-CM

## 2022-10-08 NOTE — HISTORY OF PRESENT ILLNESS
Referring Physician: Dr. Jose Gayle    Referral Reason: Stroke code    HPI:  Mr. Bronson Porter is a 69 y.o. right-handed male with past medical history significant for hypertension, diabetes and hyperlipidemia who was brought to emergency room for evaluation of word finding difficulty and mild confusion.  Apparently he was in the truck with his daughter when he noticed he has difficulty talking and cannot get the words out.  Subsequently his daughter noted he is slightly confused and did not recognize his sister-in-law.  Patient was immediately brought to emergency room and underwent a brain CT followed by CT angiogram of the head and neck which were all unremarkable.  Patient symptom resolved and his speech is fluent.  He has some visual field defect on the right side that he stated is chronic.      ROS:   Review of Systems   Constitutional:  Negative for chills, fever and malaise/fatigue.   HENT:  Negative for hearing loss and tinnitus.    Eyes:  Positive for blurred vision. Negative for double vision and photophobia.   Respiratory:  Negative for shortness of breath.    Cardiovascular:  Negative for chest pain.   Gastrointestinal:  Negative for nausea and vomiting.   Genitourinary:  Negative for hematuria.   Musculoskeletal:  Negative for back pain, falls, myalgias and neck pain.   Skin:  Negative for rash.   Neurological:  Positive for speech change. Negative for dizziness, tingling, tremors, sensory change, focal weakness, seizures, loss of consciousness, weakness and headaches.   Psychiatric/Behavioral:  Negative for memory loss.      Past Medical History:   Past Medical History:   Diagnosis Date    Diabetes (HCC)     Hypertension        Past Surgical History: History reviewed. No pertinent surgical history.    Social History:   Social History     Socioeconomic History    Marital status: Not on file     Spouse name: Not on file    Number of children: Not on file    Years of education: Not on file     "Highest education level: Not on file   Occupational History    Not on file   Tobacco Use    Smoking status: Never    Smokeless tobacco: Never   Substance and Sexual Activity    Alcohol use: Never    Drug use: Never    Sexual activity: Not on file   Other Topics Concern    Not on file   Social History Narrative    Not on file     Social Determinants of Health     Financial Resource Strain: Not on file   Food Insecurity: Not on file   Transportation Needs: Not on file   Physical Activity: Not on file   Stress: Not on file   Social Connections: Not on file   Intimate Partner Violence: Not on file   Housing Stability: Not on file       Family Hx: History reviewed. No pertinent family history.    Current Medications:   No current facility-administered medications for this encounter.     No current outpatient medications on file.       Allergies: Not on File    Physical Exam:   Vitals:    10/08/22 1426 10/08/22 1428   BP: (!) 158/67    Pulse: 74    Resp: 16    Temp: 36.2 °C (97.2 °F)    TempSrc: Temporal    SpO2: 98%    Weight:  124 kg (273 lb 9.5 oz)   Height:  1.854 m (6' 1\")       Physical Exam   GENERAL:  Lying in the hospital bed in no apparent distress.  Head: Normocephalic and atraumatic.   Eyes: Pupils are equal, round, and reactive to light. EOM are normal.  He has right visual field defect.  Cardiovascular: Normal rate and regular rhythm.    Pulmonary/Chest: Breath sounds normal.   Abdominal: Soft. Bowel sounds are normal. He exhibits no distension. There is no tenderness.   Skin: Skin is warm and dry. No rash noted. No erythema.  Neuro Exam  MENTAL STATUS:  Awake, alert, oriented times 3.  Speech is fluent, comprehension is intact.  CRANIAL NERVES:  PERRL, EOMI with no nystagmus, face is symmetric, facial sensation is intact, tongue is in the midline, palate is symmetric. Hearing is intact to finger rub bilaterally. Shoulder shrugs are normal.  He has right visual field defect.  MOTOR:  Motor examination showed " normal strength in direct testing of both upper and lower extremities, proximal and distal.    SENSATION:  Intact to light touch, temperature and proprioception throughout  REFLEXES:  2+ and symmetric, toes are downgoing bilaterally  COORDINATION:  Normal finger to nose and heel to shin bilaterally  GAIT:  Deferred       NIH Stroke Scale:    1a. Level of Consciousness (Alert, drowsy, etc): 0= Alert    1b. LOC Questions (Month, age): 0= Answers both correctly    1c. LOC Commands (Open/close eyes make fist/let go): 0= Obeys both correctly    2.   Best Gaze (Eyes open - patient follows examiner's finger on face): 0= Normal    3.   Visual Fields (introduce visual stimulus/threat to patient's field quadrants): 1= Partial Hemiania    4.   Facial Paresis (Show teeth, raise eyebrows and squeeze eyes shut): 0= Normal     5a. Motor Arm - Left (Elevate arm to 90 degrees if patient is sitting, 45 degrees if  supine): 0= No drift    5b. Motor Arm - Right (Elevate arm to 90 degrees if patient is sitting, 45 degrees if supine): 0= No drift    6a. Motor Leg - Left (Elevate leg 30 degrees with patient supine): 0= No drift    6b. Motor Leg - Right  (Elevate leg 30 degrees with patient supine): 0= No drift    7.   Limb Ataxia (Finger-nose, heel down shin): 0= No ataxia    8.   Sensory (Pin prick to face, arm, trunk and leg - compare side to side): 0= Normal    9.  Best Language (Name item, describe a picture and read sentences): 0= No aphasia    10. Dysarthria (Evaluate speech clarity by patient repeating listed words): 0= Normal articulation    11. Extinction and Inattention (Use information from prior testing to identify neglect or  double simultaneous stimuli testing): 0= No neglect    Total NIH Score: 1     Modified [place] Scale (MRS): 1 = No significant disability, despite symptoms; able to perform all usual duties and activities      Labs:  Recent Labs     10/08/22  1447   WBC 9.4   RBC 4.77   HEMOGLOBIN 14.7   HEMATOCRIT 42.6    MCV 89.3   MCH 30.8   MCHC 34.5   RDW 41.7   PLATELETCT 239   MPV 9.6                         No results for input(s): SODIUM, POTASSIUM, CHLORIDE, CO2, GLUCOSE, BUN, CPKTOTAL in the last 72 hours.  No results for input(s): SODIUM, POTASSIUM, CHLORIDE, CO2, BUN, CREATININE, MAGNESIUM, PHOSPHORUS, CALCIUM in the last 72 hours.      No results found for this or any previous visit.      Imaging reviewed:    CT-HEAD W/O   Final Result         1. No acute intracranial abnormality. No evidence of acute intracranial hemorrhage or mass lesion.                     DX-CHEST-PORTABLE (1 VIEW)    (Results Pending)   CT-CEREBRAL PERFUSION ANALYSIS    (Results Pending)   CT-CTA HEAD WITH & W/O-POST PROCESS    (Results Pending)   CT-CTA NECK WITH & W/O-POST PROCESSING    (Results Pending)          Assessment/Plan:  69 y.o. male  with multiple stroke risk factor including hypertension, diabetes and hyperlipidemia who was brought to emergency room for transient episode of confusion and word finding difficulty.  His symptom has now resolved.  He underwent brain CT followed by CT angiogram of the head and neck and CT perfusion which were all unremarkable.  He has right visual field defect that he states is chronic due to his history of retinal detachment in his right eye.  Patient is not a candidate for acute stroke intervention such as thrombolytics or thrombectomy.  He will be admitted for stroke/TIA work-up.  Continue with every 4 hours neuro check.  Obtain MRI brain without contrast, obtain echocardiogram with bubble study.  Obtain hemoglobin A1c and lipid profile.  Start aspirin and statin with a goal of LDL less than 70.  He will be evaluated by physical therapy, Occupational Therapy and speech therapy.      The patient situation is critical with a real chance of deterioration and life threatening worsening of symptom requiring my involvement. Total critical care time spent was 40 minutes.        [Time Spent: ____ minutes] : Total time spent using  services: [unfilled] minutes. The patient's primary language is not English thus required  services. [FreeTextEntry1] : CPE  [Interpreters_IDNumber] : [id number] [Interpreters_FullName] : Eddie  [de-identified] : 42 yo M pmhx h. pylori gastritis, HLD, MRSA cellulitis, prostatits, c.dif, atypical chest pain presenting for CPE. \par \par He notes that he has had chest pain for the last 2 weeks associated with dizziness. It feels like a chest pressure, a minimal amount of pain. Notes a lot of pain mainly when he gets up in the morning. He says the pain runs 'to his lungs'. When prompted further, feels like a cramping pain. Does not happen when he exerts himself, feels like he has trouble holding his breath. Has taken tylenol for the pain, and it helps, takes 4 pills, 2 in the morning, 2 at night, 200mg.  \par \par Works with trees, helping clean them up, lots of physical work where he could strain muscles. \par \par Feels some dizziness associated with some nausea. Has been having them since 2005, got medicine and it passed. Feels like the walls and the floor are moving.

## 2023-10-24 NOTE — ED PROVIDER NOTE - NSCAREINITIATED _GEN_ER
----- Message from Momo Noyola sent at 10/23/2023  2:52 PM EDT -----  Regarding: Care Gap Request  10/23/23 2:52 PM    Hello, our patient attached above has had Immunization(s) COVID BOOSTER  completed/performed. Please assist in updating the patient chart by making an External outreach to [address] [place] [address] located in [address] [place] [address]. The date of service is OCT 2023.     Thank you,  Janet Guzman   [place]  Tamir Avalos(Resident)

## 2024-01-22 ENCOUNTER — APPOINTMENT (OUTPATIENT)
Dept: INTERNAL MEDICINE | Facility: CLINIC | Age: 44
End: 2024-01-22

## 2024-01-31 ENCOUNTER — APPOINTMENT (OUTPATIENT)
Dept: INTERNAL MEDICINE | Facility: CLINIC | Age: 44
End: 2024-01-31
Payer: COMMERCIAL

## 2024-01-31 ENCOUNTER — OUTPATIENT (OUTPATIENT)
Dept: OUTPATIENT SERVICES | Facility: HOSPITAL | Age: 44
LOS: 1 days | End: 2024-01-31
Payer: SELF-PAY

## 2024-01-31 VITALS
OXYGEN SATURATION: 98 % | WEIGHT: 183 LBS | BODY MASS INDEX: 27.42 KG/M2 | SYSTOLIC BLOOD PRESSURE: 100 MMHG | HEIGHT: 68.5 IN | DIASTOLIC BLOOD PRESSURE: 60 MMHG | HEART RATE: 72 BPM

## 2024-01-31 DIAGNOSIS — F17.210 NICOTINE DEPENDENCE, CIGARETTES, UNCOMPLICATED: ICD-10-CM

## 2024-01-31 DIAGNOSIS — Z98.890 OTHER SPECIFIED POSTPROCEDURAL STATES: Chronic | ICD-10-CM

## 2024-01-31 DIAGNOSIS — Z00.00 ENCOUNTER FOR GENERAL ADULT MEDICAL EXAMINATION W/OUT ABNORMAL FINDINGS: ICD-10-CM

## 2024-01-31 DIAGNOSIS — F17.200 NICOTINE DEPENDENCE, UNSPECIFIED, UNCOMPLICATED: ICD-10-CM

## 2024-01-31 DIAGNOSIS — I10 ESSENTIAL (PRIMARY) HYPERTENSION: ICD-10-CM

## 2024-01-31 PROCEDURE — G0463: CPT

## 2024-01-31 PROCEDURE — 99407 BEHAV CHNG SMOKING > 10 MIN: CPT

## 2024-01-31 PROCEDURE — 85027 COMPLETE CBC AUTOMATED: CPT

## 2024-01-31 PROCEDURE — 83036 HEMOGLOBIN GLYCOSYLATED A1C: CPT

## 2024-01-31 PROCEDURE — 84443 ASSAY THYROID STIM HORMONE: CPT

## 2024-01-31 PROCEDURE — 99213 OFFICE O/P EST LOW 20 MIN: CPT | Mod: GE

## 2024-01-31 PROCEDURE — T1013: CPT

## 2024-01-31 PROCEDURE — 80061 LIPID PANEL: CPT

## 2024-01-31 PROCEDURE — 80053 COMPREHEN METABOLIC PANEL: CPT

## 2024-01-31 RX ORDER — MECLIZINE HYDROCHLORIDE 25 MG/1
25 TABLET ORAL
Qty: 30 | Refills: 1 | Status: DISCONTINUED | COMMUNITY
Start: 2022-02-16 | End: 2024-01-31

## 2024-01-31 RX ORDER — BACLOFEN 10 MG/1
10 TABLET ORAL 3 TIMES DAILY
Qty: 12 | Refills: 0 | Status: DISCONTINUED | COMMUNITY
Start: 2022-04-29 | End: 2024-01-31

## 2024-01-31 RX ORDER — MELOXICAM 15 MG/1
15 TABLET ORAL DAILY
Qty: 7 | Refills: 0 | Status: DISCONTINUED | COMMUNITY
Start: 2022-04-08 | End: 2024-01-31

## 2024-01-31 RX ORDER — PHENYLEPHRINE HCL 10 MG
7 TABLET ORAL DAILY
Qty: 90 | Refills: 0 | Status: ACTIVE | COMMUNITY
Start: 2024-01-31 | End: 1900-01-01

## 2024-02-01 LAB
ALBUMIN SERPL ELPH-MCNC: 4.7 G/DL
ALP BLD-CCNC: 77 U/L
ALT SERPL-CCNC: 33 U/L
ANION GAP SERPL CALC-SCNC: 14 MMOL/L
AST SERPL-CCNC: 25 U/L
BILIRUB SERPL-MCNC: 0.7 MG/DL
BUN SERPL-MCNC: 19 MG/DL
CALCIUM SERPL-MCNC: 9.8 MG/DL
CHLORIDE SERPL-SCNC: 102 MMOL/L
CHOLEST SERPL-MCNC: 227 MG/DL
CO2 SERPL-SCNC: 25 MMOL/L
CREAT SERPL-MCNC: 0.89 MG/DL
EGFR: 110 ML/MIN/1.73M2
ESTIMATED AVERAGE GLUCOSE: 114 MG/DL
GLUCOSE SERPL-MCNC: 95 MG/DL
HBA1C MFR BLD HPLC: 5.6 %
HCT VFR BLD CALC: 45.6 %
HDLC SERPL-MCNC: 56 MG/DL
HGB BLD-MCNC: 15.1 G/DL
LDLC SERPL CALC-MCNC: 146 MG/DL
MCHC RBC-ENTMCNC: 30.1 PG
MCHC RBC-ENTMCNC: 33.1 GM/DL
MCV RBC AUTO: 90.8 FL
NONHDLC SERPL-MCNC: 172 MG/DL
PLATELET # BLD AUTO: NORMAL K/UL
POTASSIUM SERPL-SCNC: 4.2 MMOL/L
PROT SERPL-MCNC: 7.4 G/DL
RBC # BLD: 5.02 M/UL
RBC # FLD: 13 %
SODIUM SERPL-SCNC: 141 MMOL/L
TRIGL SERPL-MCNC: 141 MG/DL
TSH SERPL-ACNC: 1.35 UIU/ML
WBC # FLD AUTO: 9.1 K/UL

## 2024-02-02 NOTE — ED ADULT NURSE NOTE - SUICIDE SCREENING DEPRESSION
Pt for CT. Was able to tolerate 3/4th of prep. Has some nausea when finished. Zofran given with relief.    Negative

## 2024-02-05 PROBLEM — F17.210 NICOTINE DEPENDENCE, CIGARETTES, UNCOMPLICATED: Status: ACTIVE | Noted: 2024-01-31

## 2024-02-05 NOTE — HEALTH RISK ASSESSMENT
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

## 2024-02-05 NOTE — HISTORY OF PRESENT ILLNESS
[Spouse] : spouse [FreeTextEntry1] : CPE  [de-identified] : 44 y/o man w hx H pylori, MRSA cellulitis, prostatitis, cdiff here for CPE. Patient reports desire to quit nicotine.   #Smoking  - Currently down to 3 cigarettes a day from 1.5 packs a day  - Smoked for 23 years  - Started titrating down 2 months ago  - Amenable to medication for assistance   #HCM  - Received flu vaccine 3 months ago

## 2024-02-05 NOTE — ASSESSMENT
[FreeTextEntry1] : #HCM  - Send CBC, CMP, Lipid panel, a1c, TSH   Patient discussed with attending Dr. Ambriz

## 2024-02-09 ENCOUNTER — APPOINTMENT (OUTPATIENT)
Dept: INTERNAL MEDICINE | Facility: CLINIC | Age: 44
End: 2024-02-09

## 2024-02-15 DIAGNOSIS — F17.210 NICOTINE DEPENDENCE, CIGARETTES, UNCOMPLICATED: ICD-10-CM

## 2024-02-15 DIAGNOSIS — F17.200 NICOTINE DEPENDENCE, UNSPECIFIED, UNCOMPLICATED: ICD-10-CM

## 2024-04-12 NOTE — ED ADULT TRIAGE NOTE - HEART RATE (BEATS/MIN)
Assistance OOB with selected safe patient handling equipment if applicable/Assistance with ambulation/Communicate fall risk and risk factors to all staff, patient, and family/Monitor for mental status changes and reorient to person, place, and time, as needed/Move patient closer to nursing station/within visual sight of ED staff/Provide visual cue: yellow wristband, yellow gown, etc/Reinforce activity limits and safety measures with patient and family/Toileting schedule using arm’s reach rule for commode and bathroom/Use of alarms - bed, stretcher, chair and/or video monitoring/Call bell, personal items and telephone in reach/Instruct patient to call for assistance before getting out of bed/chair/stretcher/Non-slip footwear applied when patient is off stretcher/[place] to call system/Physically safe environment - no spills, clutter or unnecessary equipment/Purposeful Proactive Rounding/Room/bathroom lighting operational, light cord in reach 122

## 2024-12-02 ENCOUNTER — INPATIENT (INPATIENT)
Facility: HOSPITAL | Age: 44
LOS: 2 days | Discharge: HOME | DRG: 392 | End: 2024-12-05
Attending: SURGERY | Admitting: STUDENT IN AN ORGANIZED HEALTH CARE EDUCATION/TRAINING PROGRAM
Payer: MEDICAID

## 2024-12-02 VITALS
HEIGHT: 68 IN | WEIGHT: 177.03 LBS | OXYGEN SATURATION: 98 % | RESPIRATION RATE: 18 BRPM | TEMPERATURE: 98 F | DIASTOLIC BLOOD PRESSURE: 76 MMHG | HEART RATE: 104 BPM | SYSTOLIC BLOOD PRESSURE: 120 MMHG

## 2024-12-02 DIAGNOSIS — Z98.890 OTHER SPECIFIED POSTPROCEDURAL STATES: Chronic | ICD-10-CM

## 2024-12-02 LAB
ALBUMIN SERPL ELPH-MCNC: 3.9 G/DL — SIGNIFICANT CHANGE UP (ref 3.3–5)
ALP SERPL-CCNC: 85 U/L — SIGNIFICANT CHANGE UP (ref 40–120)
ALT FLD-CCNC: 23 U/L — SIGNIFICANT CHANGE UP (ref 10–45)
ANION GAP SERPL CALC-SCNC: 13 MMOL/L — SIGNIFICANT CHANGE UP (ref 5–17)
APPEARANCE UR: CLEAR — SIGNIFICANT CHANGE UP
AST SERPL-CCNC: 19 U/L — SIGNIFICANT CHANGE UP (ref 10–40)
BACTERIA # UR AUTO: NEGATIVE /HPF — SIGNIFICANT CHANGE UP
BASOPHILS # BLD AUTO: 0.02 K/UL — SIGNIFICANT CHANGE UP (ref 0–0.2)
BASOPHILS NFR BLD AUTO: 0.2 % — SIGNIFICANT CHANGE UP (ref 0–2)
BILIRUB SERPL-MCNC: 0.5 MG/DL — SIGNIFICANT CHANGE UP (ref 0.2–1.2)
BILIRUB UR-MCNC: NEGATIVE — SIGNIFICANT CHANGE UP
BUN SERPL-MCNC: 18 MG/DL — SIGNIFICANT CHANGE UP (ref 7–23)
CALCIUM SERPL-MCNC: 9.1 MG/DL — SIGNIFICANT CHANGE UP (ref 8.4–10.5)
CAST: 0 /LPF — SIGNIFICANT CHANGE UP (ref 0–4)
CHLORIDE SERPL-SCNC: 101 MMOL/L — SIGNIFICANT CHANGE UP (ref 96–108)
CO2 SERPL-SCNC: 24 MMOL/L — SIGNIFICANT CHANGE UP (ref 22–31)
COLOR SPEC: SIGNIFICANT CHANGE UP
CREAT SERPL-MCNC: 1.03 MG/DL — SIGNIFICANT CHANGE UP (ref 0.5–1.3)
DIFF PNL FLD: NEGATIVE — SIGNIFICANT CHANGE UP
EGFR: 92 ML/MIN/1.73M2 — SIGNIFICANT CHANGE UP
EOSINOPHIL # BLD AUTO: 0.13 K/UL — SIGNIFICANT CHANGE UP (ref 0–0.5)
EOSINOPHIL NFR BLD AUTO: 1 % — SIGNIFICANT CHANGE UP (ref 0–6)
GAS PNL BLDV: SIGNIFICANT CHANGE UP
GLUCOSE SERPL-MCNC: 97 MG/DL — SIGNIFICANT CHANGE UP (ref 70–99)
GLUCOSE UR QL: NEGATIVE MG/DL — SIGNIFICANT CHANGE UP
HCT VFR BLD CALC: 41 % — SIGNIFICANT CHANGE UP (ref 39–50)
HGB BLD-MCNC: 13.6 G/DL — SIGNIFICANT CHANGE UP (ref 13–17)
IMM GRANULOCYTES NFR BLD AUTO: 0.4 % — SIGNIFICANT CHANGE UP (ref 0–0.9)
KETONES UR-MCNC: NEGATIVE MG/DL — SIGNIFICANT CHANGE UP
LEUKOCYTE ESTERASE UR-ACNC: NEGATIVE — SIGNIFICANT CHANGE UP
LIDOCAIN IGE QN: 24 U/L — SIGNIFICANT CHANGE UP (ref 7–60)
LYMPHOCYTES # BLD AUTO: 16.2 % — SIGNIFICANT CHANGE UP (ref 13–44)
LYMPHOCYTES # BLD AUTO: 2.14 K/UL — SIGNIFICANT CHANGE UP (ref 1–3.3)
MCHC RBC-ENTMCNC: 30.2 PG — SIGNIFICANT CHANGE UP (ref 27–34)
MCHC RBC-ENTMCNC: 33.2 G/DL — SIGNIFICANT CHANGE UP (ref 32–36)
MCV RBC AUTO: 91.1 FL — SIGNIFICANT CHANGE UP (ref 80–100)
MONOCYTES # BLD AUTO: 1.33 K/UL — HIGH (ref 0–0.9)
MONOCYTES NFR BLD AUTO: 10 % — SIGNIFICANT CHANGE UP (ref 2–14)
NEUTROPHILS # BLD AUTO: 9.57 K/UL — HIGH (ref 1.8–7.4)
NEUTROPHILS NFR BLD AUTO: 72.2 % — SIGNIFICANT CHANGE UP (ref 43–77)
NITRITE UR-MCNC: POSITIVE
NRBC # BLD: 0 /100 WBCS — SIGNIFICANT CHANGE UP (ref 0–0)
PH UR: 6.5 — SIGNIFICANT CHANGE UP (ref 5–8)
PLATELET # BLD AUTO: 142 K/UL — LOW (ref 150–400)
POTASSIUM SERPL-MCNC: 4.2 MMOL/L — SIGNIFICANT CHANGE UP (ref 3.5–5.3)
POTASSIUM SERPL-SCNC: 4.2 MMOL/L — SIGNIFICANT CHANGE UP (ref 3.5–5.3)
PROT SERPL-MCNC: 7.2 G/DL — SIGNIFICANT CHANGE UP (ref 6–8.3)
PROT UR-MCNC: NEGATIVE MG/DL — SIGNIFICANT CHANGE UP
RBC # BLD: 4.5 M/UL — SIGNIFICANT CHANGE UP (ref 4.2–5.8)
RBC # FLD: 13 % — SIGNIFICANT CHANGE UP (ref 10.3–14.5)
RBC CASTS # UR COMP ASSIST: 0 /HPF — SIGNIFICANT CHANGE UP (ref 0–4)
REVIEW: SIGNIFICANT CHANGE UP
SODIUM SERPL-SCNC: 138 MMOL/L — SIGNIFICANT CHANGE UP (ref 135–145)
SP GR SPEC: 1.02 — SIGNIFICANT CHANGE UP (ref 1–1.03)
SQUAMOUS # UR AUTO: 1 /HPF — SIGNIFICANT CHANGE UP (ref 0–5)
UROBILINOGEN FLD QL: 1 MG/DL — SIGNIFICANT CHANGE UP (ref 0.2–1)
WBC # BLD: 13.24 K/UL — HIGH (ref 3.8–10.5)
WBC # FLD AUTO: 13.24 K/UL — HIGH (ref 3.8–10.5)
WBC UR QL: 0 /HPF — SIGNIFICANT CHANGE UP (ref 0–5)

## 2024-12-02 PROCEDURE — 99285 EMERGENCY DEPT VISIT HI MDM: CPT

## 2024-12-02 RX ORDER — ACETAMINOPHEN 500MG 500 MG/1
1000 TABLET, COATED ORAL ONCE
Refills: 0 | Status: COMPLETED | OUTPATIENT
Start: 2024-12-02 | End: 2024-12-02

## 2024-12-02 RX ORDER — SODIUM CHLORIDE 9 MG/ML
1000 INJECTION, SOLUTION INTRAMUSCULAR; INTRAVENOUS; SUBCUTANEOUS ONCE
Refills: 0 | Status: COMPLETED | OUTPATIENT
Start: 2024-12-02 | End: 2024-12-02

## 2024-12-02 RX ADMIN — SODIUM CHLORIDE 1000 MILLILITER(S): 9 INJECTION, SOLUTION INTRAMUSCULAR; INTRAVENOUS; SUBCUTANEOUS at 20:56

## 2024-12-02 RX ADMIN — ACETAMINOPHEN 500MG 400 MILLIGRAM(S): 500 TABLET, COATED ORAL at 20:56

## 2024-12-02 NOTE — ED ADULT TRIAGE NOTE - IDEAL BODY WEIGHT(KG)
68 Griseofulvin Counseling:  I discussed with the patient the risks of griseofulvin including but not limited to photosensitivity, cytopenia, liver damage, nausea/vomiting and severe allergy.  The patient understands that this medication is best absorbed when taken with a fatty meal (e.g., ice cream or french fries).

## 2024-12-02 NOTE — ED PROVIDER NOTE - CLINICAL SUMMARY MEDICAL DECISION MAKING FREE TEXT BOX
44-year-old male with history of superficial lipomas presents to the emergency department with abdominal pain for the past 2 days.  Patient endorses nausea,, constipation.  Patient also endorses dysuria.  Patient afebrile and HD stable.  Patient has diffuse abdominal tenderness with suprapubic tenderness to palpation.  Orders include labs, CT abdomen and pelvis.  Dispo pending labs, imaging and reassessment.

## 2024-12-02 NOTE — ED PROVIDER NOTE - PHYSICAL EXAMINATION
Const: Awake, alert, no acute distress.  Well appearing.  Moving comfortably on stretcher.  Cardiac: Regular rate and regular rhythm. S1 S2 present.  Peripheral pulses 2+ and symmetric. No LE edema.  Resp: Speaking in full sentences. No evidence of respiratory distress.  Breath sounds clear to auscultation b/l. Normal chest excursion.   Abd: Non-distended, no overlying skin changes. Diffuse abdominal ttp. Soft,  no guarding, no rigidity, no rebound tenderness.  No palpable masses.  Normal bowel sounds in all 4 quadrants.  Back: Spine midline and non-tender. No CVAT.  Skin: Normal coloration.  No rashes, abrasions or lacerations.  Neuro: Awake, alert & oriented x 3.  Moves all extremities spontaneously.  No focal deficits.

## 2024-12-02 NOTE — ED PROVIDER NOTE - ATTENDING CONTRIBUTION TO CARE
44-year-old male history of superficial lipoma removal current smoker presenting for evaluation of lower abdominal pain for the past 2 to 3 days with associated nausea and emesis.  Patient also reports pain with urination and defecation as bearing down causes worsening of his lower abdominal discomfort.  He is reporting subjective fever and chills.  His vital signs are nonactionable physical exam significant for adult male in no significant distress normal heart and lung sounds positive bowel sounds abdomen soft with diffuse lower abdominal tenderness to palpation greatest in the suprapubic and left lower quadrant.  No CVA tenderness.  Differential diagnosis includes but is not limited to colitis, diverticulitis, UTI, appendicitis.  Will check labs urinalysis and CT abdomen pelvis.  Will provide pain control antiemetics and IV fluids.  Patient will remain n.p.o. until imaging is conducted.  Dispo depend upon results of labs imaging and reassessments.

## 2024-12-03 DIAGNOSIS — Z98.890 OTHER SPECIFIED POSTPROCEDURAL STATES: Chronic | ICD-10-CM

## 2024-12-03 DIAGNOSIS — K57.80 DIVERTICULITIS OF INTESTINE, PART UNSPECIFIED, WITH PERFORATION AND ABSCESS WITHOUT BLEEDING: ICD-10-CM

## 2024-12-03 LAB
ANION GAP SERPL CALC-SCNC: 12 MMOL/L — SIGNIFICANT CHANGE UP (ref 5–17)
BUN SERPL-MCNC: 10 MG/DL — SIGNIFICANT CHANGE UP (ref 7–23)
CALCIUM SERPL-MCNC: 9 MG/DL — SIGNIFICANT CHANGE UP (ref 8.4–10.5)
CHLORIDE SERPL-SCNC: 101 MMOL/L — SIGNIFICANT CHANGE UP (ref 96–108)
CO2 SERPL-SCNC: 24 MMOL/L — SIGNIFICANT CHANGE UP (ref 22–31)
CREAT SERPL-MCNC: 0.81 MG/DL — SIGNIFICANT CHANGE UP (ref 0.5–1.3)
CULTURE RESULTS: NO GROWTH — SIGNIFICANT CHANGE UP
EGFR: 112 ML/MIN/1.73M2 — SIGNIFICANT CHANGE UP
GLUCOSE SERPL-MCNC: 85 MG/DL — SIGNIFICANT CHANGE UP (ref 70–99)
HCT VFR BLD CALC: 42.3 % — SIGNIFICANT CHANGE UP (ref 39–50)
HGB BLD-MCNC: 13.7 G/DL — SIGNIFICANT CHANGE UP (ref 13–17)
MAGNESIUM SERPL-MCNC: 2.1 MG/DL — SIGNIFICANT CHANGE UP (ref 1.6–2.6)
MCHC RBC-ENTMCNC: 29.4 PG — SIGNIFICANT CHANGE UP (ref 27–34)
MCHC RBC-ENTMCNC: 32.4 G/DL — SIGNIFICANT CHANGE UP (ref 32–36)
MCV RBC AUTO: 90.8 FL — SIGNIFICANT CHANGE UP (ref 80–100)
NRBC # BLD: 0 /100 WBCS — SIGNIFICANT CHANGE UP (ref 0–0)
PHOSPHATE SERPL-MCNC: 3.9 MG/DL — SIGNIFICANT CHANGE UP (ref 2.5–4.5)
PLATELET # BLD AUTO: 100 K/UL — LOW (ref 150–400)
POTASSIUM SERPL-MCNC: 3.8 MMOL/L — SIGNIFICANT CHANGE UP (ref 3.5–5.3)
POTASSIUM SERPL-SCNC: 3.8 MMOL/L — SIGNIFICANT CHANGE UP (ref 3.5–5.3)
RBC # BLD: 4.66 M/UL — SIGNIFICANT CHANGE UP (ref 4.2–5.8)
RBC # FLD: 13 % — SIGNIFICANT CHANGE UP (ref 10.3–14.5)
SODIUM SERPL-SCNC: 137 MMOL/L — SIGNIFICANT CHANGE UP (ref 135–145)
SPECIMEN SOURCE: SIGNIFICANT CHANGE UP
WBC # BLD: 13.67 K/UL — HIGH (ref 3.8–10.5)
WBC # FLD AUTO: 13.67 K/UL — HIGH (ref 3.8–10.5)

## 2024-12-03 PROCEDURE — 99233 SBSQ HOSP IP/OBS HIGH 50: CPT

## 2024-12-03 PROCEDURE — 74177 CT ABD & PELVIS W/CONTRAST: CPT | Mod: 26,MC

## 2024-12-03 RX ORDER — METRONIDAZOLE 500 MG/1
500 TABLET ORAL EVERY 12 HOURS
Refills: 0 | Status: DISCONTINUED | OUTPATIENT
Start: 2024-12-03 | End: 2024-12-05

## 2024-12-03 RX ORDER — ACETAMINOPHEN 500MG 500 MG/1
975 TABLET, COATED ORAL EVERY 6 HOURS
Refills: 0 | Status: DISCONTINUED | OUTPATIENT
Start: 2024-12-03 | End: 2024-12-05

## 2024-12-03 RX ORDER — ELECTROLYTE-M SOLUTION/D5W 5 %
1000 INTRAVENOUS SOLUTION INTRAVENOUS
Refills: 0 | Status: DISCONTINUED | OUTPATIENT
Start: 2024-12-03 | End: 2024-12-05

## 2024-12-03 RX ORDER — CEFTRIAXONE SODIUM 1 G
1000 VIAL (EA) INJECTION ONCE
Refills: 0 | Status: COMPLETED | OUTPATIENT
Start: 2024-12-03 | End: 2024-12-03

## 2024-12-03 RX ORDER — PIPERACILLIN SODIUM AND TAZOBACTAM SODIUM 4; .5 G/20ML; G/20ML
3.38 INJECTION, POWDER, LYOPHILIZED, FOR SOLUTION INTRAVENOUS ONCE
Refills: 0 | Status: COMPLETED | OUTPATIENT
Start: 2024-12-03 | End: 2024-12-03

## 2024-12-03 RX ORDER — INFLUENZA VIRUS VACCINE 15; 15; 15; 15 UG/.5ML; UG/.5ML; UG/.5ML; UG/.5ML
0.5 SUSPENSION INTRAMUSCULAR ONCE
Refills: 0 | Status: DISCONTINUED | OUTPATIENT
Start: 2024-12-03 | End: 2024-12-05

## 2024-12-03 RX ORDER — ENOXAPARIN SODIUM 30 MG/.3ML
40 INJECTION SUBCUTANEOUS EVERY 24 HOURS
Refills: 0 | Status: DISCONTINUED | OUTPATIENT
Start: 2024-12-03 | End: 2024-12-05

## 2024-12-03 RX ORDER — 0.9 % SODIUM CHLORIDE 0.9 %
1000 INTRAVENOUS SOLUTION INTRAVENOUS
Refills: 0 | Status: DISCONTINUED | OUTPATIENT
Start: 2024-12-03 | End: 2024-12-03

## 2024-12-03 RX ORDER — CIPROFLOXACIN HCL 750 MG
400 TABLET ORAL EVERY 12 HOURS
Refills: 0 | Status: DISCONTINUED | OUTPATIENT
Start: 2024-12-03 | End: 2024-12-05

## 2024-12-03 RX ADMIN — Medication 100 MILLILITER(S): at 16:08

## 2024-12-03 RX ADMIN — Medication 200 MILLIGRAM(S): at 18:21

## 2024-12-03 RX ADMIN — Medication 100 MILLIGRAM(S): at 01:11

## 2024-12-03 RX ADMIN — Medication 200 MILLIGRAM(S): at 06:23

## 2024-12-03 RX ADMIN — METRONIDAZOLE 100 MILLIGRAM(S): 500 TABLET ORAL at 17:19

## 2024-12-03 RX ADMIN — METRONIDAZOLE 100 MILLIGRAM(S): 500 TABLET ORAL at 06:23

## 2024-12-03 RX ADMIN — ENOXAPARIN SODIUM 40 MILLIGRAM(S): 30 INJECTION SUBCUTANEOUS at 11:15

## 2024-12-03 RX ADMIN — ACETAMINOPHEN 500MG 1000 MILLIGRAM(S): 500 TABLET, COATED ORAL at 01:50

## 2024-12-03 RX ADMIN — PIPERACILLIN SODIUM AND TAZOBACTAM SODIUM 200 GRAM(S): 4; .5 INJECTION, POWDER, LYOPHILIZED, FOR SOLUTION INTRAVENOUS at 02:42

## 2024-12-03 NOTE — ED ADULT NURSE NOTE - OBJECTIVE STATEMENT
pt is 44y M, denies pmhx, c/o abdominal pain, nausea, decreased PO intake for 2 days, pt abdomen nondistended, tender upon LQ palpation, pt normal BM, no blood in stools, also experiencing dysuria, no other symptoms, pt A&Ox4, ambulatory, updated on plan of care

## 2024-12-03 NOTE — PATIENT PROFILE ADULT - FALL HARM RISK - UNIVERSAL INTERVENTIONS
Bed in lowest position, wheels locked, appropriate side rails in place/Call bell, personal items and telephone in reach/Instruct patient to call for assistance before getting out of bed or chair/Non-slip footwear when patient is out of bed/[place] to call system/Physically safe environment - no spills, clutter or unnecessary equipment/Purposeful Proactive Rounding/Room/bathroom lighting operational, light cord in reach

## 2024-12-03 NOTE — PATIENT PROFILE ADULT - FALL HARM RISK - FALL HARM RISK
No indicators present Finasteride Counseling:  I discussed with the patient the risks of use of finasteride including but not limited to decreased libido, decreased ejaculate volume, gynecomastia, and depression. Women should not handle medication.  All of the patient's questions and concerns were addressed. Finasteride Male Counseling: Finasteride Counseling:  I discussed with the patient the risks of use of finasteride including but not limited to decreased libido, decreased ejaculate volume, gynecomastia, and depression. Women should not handle medication.  All of the patient's questions and concerns were addressed.

## 2024-12-03 NOTE — PATIENT PROFILE ADULT - FUNCTIONAL ASSESSMENT - DAILY ACTIVITY 1.
Schedule Procedure:   Please Schedule Routine (next available or patient preference)  Procedure: Colonoscopy (38279) with SuPrep  Diagnosis: Positive Cologuard R19.5  Is patient:    Diabetic? No   On Coumadin, heparin, lovenox? No   On ASA/NSAIDS? Platelet Modifying (examples - Plavix, aspirin, nsaids, Aggrenox)? No  Latex allergy: No  Sleep apnea: No  Location: Patient Preference - [facility] or [facility]  Special Instructions:   MAC Anesthesia  For MAC patients if applicable, please verify to hold medications prior to procedure:   Sildenafil, Verdenafil, Tadalafil x 48 hours      4 = No assist / stand by assistance

## 2024-12-03 NOTE — H&P ADULT - NSHPPHYSICALEXAM_GEN_ALL_CORE
General: alert and oriented, NAD  Resp: airway patent, respirations unlabored  CVS: regular rate and rhythm  Abdomen: soft, tender to palpation in LLQ, mildly distended  Extremities: no edema  Skin: warm, dry, appropriate color

## 2024-12-03 NOTE — H&P ADULT - ASSESSMENT
44M presenting with 2 days of abdominal pain, leukocytosis and CT findings consistent with acute complicated diverticulitis with small foci of extraluminal air    Plan:  - Admit to Dr Munoz  - Bowel rest. NPO  - IVF  - Cipro/flagyl  - Serial abdominal exams    Colorectal (Red) Surgery [phone]

## 2024-12-03 NOTE — H&P ADULT - HISTORY OF PRESENT ILLNESS
44M presenting with 2 days of abdominal pain with vomiting and subjective fevers. He reports the pain started suddenly, was located in the LLQ/lower midabdomen and was associated with decreased appetite and feeling febrile, though he never took his temperature. Last bowel movement was 2 days ago and normal, no diarrhea. Reports passing flatus. He has never had a colonoscopy

## 2024-12-03 NOTE — H&P ADULT - NSHPLABSRESULTS_GEN_ALL_CORE
13.6   . )-----------( 142      ( 02 Dec 2024 21:13 )             41.0     12    138  |  101  |  18  ----------------------------<  97  4.2   |  24  |  1.03    Ca    9.1      02 Dec 2024 21:13    TPro  7.2  /  Alb  3.9  /  TBili  0.5  /  DBili  x   /  AST  19  /  ALT  23  /  AlkPhos  85  12-02      Urinalysis Basic - ( 02 Dec 2024 21:13 )    Color: Dark Yellow / Appearance: Clear / S.021 / pH: x  Gluc: 97 mg/dL / Ketone: Negative mg/dL  / Bili: Negative / Urobili: 1.0 mg/dL   Blood: x / Protein: Negative mg/dL / Nitrite: Positive   Leuk Esterase: Negative / RBC: 0 /HPF / WBC 0 /HPF   Sq Epi: x / Non Sq Epi: 1 /HPF / Bacteria: Negative /HPF    < from: CT Abdomen and Pelvis w/ IV Cont (24 @ 00:23) >    FINDINGS:  LOWER CHEST: Mild bibasilar dependent and platelike atelectasis.    LIVER: Within normal limits.  BILE DUCTS: Normal caliber.  GALLBLADDER: Within normal limits.  SPLEEN: Within normal limits.  PANCREAS: Within normal limits.  ADRENALS: Within normal limits.  KIDNEYS/URETERS: Kidneys enhance symmetrically without hydronephrosis or   focal renal parenchymal lesion.    BLADDER: Within normal limits.  REPRODUCTIVE ORGANS: Prostate within normal limits.    BOWEL: No bowel obstruction. Appendix is normal. Sigmoid diverticulosis   with short segment thickening of the mid sigmoid colon about an inflamed   diverticulum with moderate surrounding inflammatory change and small foci   of localized extraluminal air. No remote free intraperitoneal air.  PERITONEUM/RETROPERITONEUM: Trace left lower quadrant and pelvic free   fluid. No loculated fluid collection to suggest abscess.  VESSELS: Within normal limits.  LYMPH NODES: No lymphadenopathy.  ABDOMINAL WALL: Within normal limits.  BONES: Degenerative changes of the spine.    IMPRESSION:  Acute perforated mid sigmoid diverticulitis with moderate surrounding   inflammatory change and small foci of localized extraluminal air. No   remote free intraperitoneal air. No pericolonic abscess.    < end of copied text >

## 2024-12-03 NOTE — ED ADULT NURSE REASSESSMENT NOTE - NS ED NURSE REASSESS COMMENT FT1
bladder scan for pt 405cc, pt states does not feel urge to go, pt went to attempt to urinate, pt post void 30cc, MD Hill made aware

## 2024-12-04 ENCOUNTER — APPOINTMENT (OUTPATIENT)
Dept: INTERNAL MEDICINE | Facility: CLINIC | Age: 44
End: 2024-12-04

## 2024-12-04 ENCOUNTER — TRANSCRIPTION ENCOUNTER (OUTPATIENT)
Age: 44
End: 2024-12-04

## 2024-12-04 LAB
ANION GAP SERPL CALC-SCNC: 13 MMOL/L — SIGNIFICANT CHANGE UP (ref 5–17)
BUN SERPL-MCNC: 8 MG/DL — SIGNIFICANT CHANGE UP (ref 7–23)
CALCIUM SERPL-MCNC: 9.2 MG/DL — SIGNIFICANT CHANGE UP (ref 8.4–10.5)
CHLORIDE SERPL-SCNC: 102 MMOL/L — SIGNIFICANT CHANGE UP (ref 96–108)
CO2 SERPL-SCNC: 25 MMOL/L — SIGNIFICANT CHANGE UP (ref 22–31)
CREAT SERPL-MCNC: 0.95 MG/DL — SIGNIFICANT CHANGE UP (ref 0.5–1.3)
EGFR: 102 ML/MIN/1.73M2 — SIGNIFICANT CHANGE UP
GLUCOSE SERPL-MCNC: 92 MG/DL — SIGNIFICANT CHANGE UP (ref 70–99)
HCT VFR BLD CALC: 40.3 % — SIGNIFICANT CHANGE UP (ref 39–50)
HGB BLD-MCNC: 13.2 G/DL — SIGNIFICANT CHANGE UP (ref 13–17)
MAGNESIUM SERPL-MCNC: 2.2 MG/DL — SIGNIFICANT CHANGE UP (ref 1.6–2.6)
MCHC RBC-ENTMCNC: 30 PG — SIGNIFICANT CHANGE UP (ref 27–34)
MCHC RBC-ENTMCNC: 32.8 G/DL — SIGNIFICANT CHANGE UP (ref 32–36)
MCV RBC AUTO: 91.6 FL — SIGNIFICANT CHANGE UP (ref 80–100)
NRBC # BLD: 0 /100 WBCS — SIGNIFICANT CHANGE UP (ref 0–0)
PHOSPHATE SERPL-MCNC: 3.7 MG/DL — SIGNIFICANT CHANGE UP (ref 2.5–4.5)
PLATELET # BLD AUTO: 65 K/UL — LOW (ref 150–400)
POTASSIUM SERPL-MCNC: 4.4 MMOL/L — SIGNIFICANT CHANGE UP (ref 3.5–5.3)
POTASSIUM SERPL-SCNC: 4.4 MMOL/L — SIGNIFICANT CHANGE UP (ref 3.5–5.3)
RBC # BLD: 4.4 M/UL — SIGNIFICANT CHANGE UP (ref 4.2–5.8)
RBC # FLD: 12.9 % — SIGNIFICANT CHANGE UP (ref 10.3–14.5)
SODIUM SERPL-SCNC: 140 MMOL/L — SIGNIFICANT CHANGE UP (ref 135–145)
WBC # BLD: 9.48 K/UL — SIGNIFICANT CHANGE UP (ref 3.8–10.5)
WBC # FLD AUTO: 9.48 K/UL — SIGNIFICANT CHANGE UP (ref 3.8–10.5)

## 2024-12-04 PROCEDURE — 99233 SBSQ HOSP IP/OBS HIGH 50: CPT

## 2024-12-04 RX ORDER — AMOXICILLIN/POTASSIUM CLAV 250-125 MG
875 TABLET ORAL
Qty: 20 | Refills: 0
Start: 2024-12-04 | End: 2024-12-13

## 2024-12-04 RX ADMIN — METRONIDAZOLE 100 MILLIGRAM(S): 500 TABLET ORAL at 06:20

## 2024-12-04 RX ADMIN — ENOXAPARIN SODIUM 40 MILLIGRAM(S): 30 INJECTION SUBCUTANEOUS at 12:54

## 2024-12-04 RX ADMIN — Medication 200 MILLIGRAM(S): at 17:11

## 2024-12-04 RX ADMIN — METRONIDAZOLE 100 MILLIGRAM(S): 500 TABLET ORAL at 17:10

## 2024-12-04 RX ADMIN — Medication 200 MILLIGRAM(S): at 06:20

## 2024-12-04 RX ADMIN — Medication 100 MILLILITER(S): at 17:10

## 2024-12-04 NOTE — DISCHARGE NOTE PROVIDER - NSDCCPCAREPLAN_GEN_ALL_CORE_FT
PRINCIPAL DISCHARGE DIAGNOSIS  Diagnosis: Perforated diverticulum  Assessment and Plan of Treatment: Bowel rest, IV abx.     PRINCIPAL DISCHARGE DIAGNOSIS  Diagnosis: Perforated diverticulum  Assessment and Plan of Treatment: Notify your surgeon and return to ER for temperatures greater than 101, chills sweats, pain not controlled with pain medications, persistent nausea and vomiting, inability to tolerate food, significant abdominal bloating/distension, no passing of flatus or bowel movements, or acutely concerning matters to you, that may require urgent medical attention.  Please start Augmentin 875-125mg, twice a day for 10 days. Please complete entire 10 day course of treatment for adequate antibiotic coverage of infection, and avoiding potential antibiotic resistance.   FOLLOW-UP:  1. Please call to make a follow-up appointment with Dr. Camp within two weeks of discharge   2. Please follow up with your primary care physician in one week regarding your hospitalization.

## 2024-12-04 NOTE — PROGRESS NOTE ADULT - SUBJECTIVE AND OBJECTIVE BOX
SURGERY DAILY PROGRESS NOTE     Interval:  NAEON     Subjective:   Pt seen and examined on AM rounds.   Feels better today. Pain controlled.   Passing gas. No BM.   No pain on urination.   ______________________________________________  OBJECTIVE:   T(C): 36.8 (12-04-24 @ 04:31), Max: 37.1 (12-03-24 @ 13:53)  HR: 64 (12-04-24 @ 04:31) (64 - 85)  BP: 97/56 (12-04-24 @ 04:31) (95/58 - 112/69)  RR: 18 (12-04-24 @ 04:31) (18 - 18)  SpO2: 97% (12-04-24 @ 04:31) (96% - 98%)  Wt(kg): --  CAPILLARY BLOOD GLUCOSE        I&O's Detail    03 Dec 2024 07:01  -  04 Dec 2024 07:00  --------------------------------------------------------  IN:    dextrose 5% + sodium chloride 0.45% w/ Additives: 300 mL    IV PiggyBack: 100 mL    IV PiggyBack: 200 mL    Lactated Ringers: 900 mL  Total IN: 1500 mL    OUT:    Voided (mL): 2700 mL  Total OUT: 2700 mL    Total NET: -1200 mL      ______________________________________________  LABS:  CBC Full  -  ( 03 Dec 2024 13:10 )  WBC Count : 13.67 K/uL  RBC Count : 4.66 M/uL  Hemoglobin : 13.7 g/dL  Hematocrit : 42.3 %  Platelet Count - Automated : 100 K/uL  Mean Cell Volume : 90.8 fl  Mean Cell Hemoglobin : 29.4 pg  Mean Cell Hemoglobin Concentration : 32.4 g/dL      12-03    137  |  101  |  10  ----------------------------<  85  3.8   |  24  |  0.81    Ca    9.0      03 Dec 2024 13:10  Phos  3.9     12-03  Mg     2.1     12-03    TPro  7.2  /  Alb  3.9  /  TBili  0.5  /  DBili  x   /  AST  19  /  ALT  23  /  AlkPhos  85  12-02    _____________________________________________  RADIOLOGY:      Physical exam:  General: alert and oriented, NAD  Resp: airway patent, respirations unlabored  CVS: regular rate and rhythm  Abdomen: soft, improved tenderness to palpation in LLQ, non distended   Extremities: no edema  Skin: warm, dry, appropriate color

## 2024-12-04 NOTE — DISCHARGE NOTE PROVIDER - PROVIDER TOKENS
PROVIDER:[TOKEN:[[id number]],FOLLOWUP:[1 week]] PROVIDER:[TOKEN:[[id number]],FOLLOWUP:[2 weeks]]

## 2024-12-04 NOTE — DISCHARGE NOTE PROVIDER - CARE PROVIDER_API CALL
Juan Camp  Colon/Rectal Surgery  [address] [place] [address], [address]  [place] [address]  Phone: [phone]  Fax: [phone]  Follow Up Time: 1 week   Juan Camp  Colon/Rectal Surgery  [address] [place] [address], [address]  [place] [address]  Phone: [phone]  Fax: [phone]  Follow Up Time: 2 weeks

## 2024-12-04 NOTE — DISCHARGE NOTE PROVIDER - HOSPITAL COURSE
44M with no PMH presented to the ED on 12/3 with a 2 day history of abdominal pain, vomiting and subjective fevers. His pain was located in the LLQ/lower midabdomen and was associated with decreased appetite and feeling febrile. CT abdomen/pelvis demonstrated acute complicated diverticulitis with no abscess. In the ED, he was afebrile, vital signs stable. His labs were notable for an elevated WBC to 13.2. He was admitted to surgery, started on conservative management with IV ciprofloxacin and flagyl. The following day, his pain improved and was started on a clears and tolerated well.    His GI function returned.     On the day of discharge, he was hemodynamically stable and optimized for discharge. He was discharged on **** abx. He will follow up with Dr Camp in 1-2 weeks. 44M with no PMH presented to the ED on 12/3 with a 2 day history of abdominal pain, vomiting and subjective fevers. His pain was located in the LLQ/lower midabdomen and was associated with decreased appetite and feeling febrile. CT abdomen/pelvis demonstrated acute complicated diverticulitis with no abscess. In the ED, he was afebrile, vital signs stable. His labs were notable for an elevated WBC to 13.2. He was admitted to surgery, started on conservative management with IV ciprofloxacin and flagyl. The following day, his pain improved and was started on a clears and tolerated well.    His GI function returned.     On the day of discharge, he was hemodynamically stable and optimized for discharge. He was discharged on PO augmentin. He will follow up with Dr Camp in 1-2 weeks. 44M with no PMH presented to the ED on 12/3 with a 2 day history of abdominal pain, vomiting and subjective fevers. His pain was located in the LLQ/lower midabdomen and was associated with decreased appetite and feeling febrile. CT abdomen/pelvis demonstrated acute complicated diverticulitis with no abscess. In the ED, he was afebrile, vital signs stable. His labs were notable for an elevated WBC to 13.2. He was admitted to surgery, started on conservative management with IV ciprofloxacin and flagyl. The following day, his pain improved and was started on a clears and tolerated well.    His GI function returned.     On the day of discharge, he was hemodynamically stable and optimized for discharge. Pt was transitioned to PO augmented and tolerated first dose in house. He was discharged on PO augmentin. He will follow up with Dr Camp in 1-2 weeks.

## 2024-12-05 ENCOUNTER — TRANSCRIPTION ENCOUNTER (OUTPATIENT)
Age: 44
End: 2024-12-05

## 2024-12-05 VITALS
OXYGEN SATURATION: 98 % | DIASTOLIC BLOOD PRESSURE: 75 MMHG | RESPIRATION RATE: 18 BRPM | HEART RATE: 67 BPM | TEMPERATURE: 99 F | SYSTOLIC BLOOD PRESSURE: 119 MMHG

## 2024-12-05 LAB
ANION GAP SERPL CALC-SCNC: 12 MMOL/L — SIGNIFICANT CHANGE UP (ref 5–17)
BUN SERPL-MCNC: 16 MG/DL — SIGNIFICANT CHANGE UP (ref 7–23)
CALCIUM SERPL-MCNC: 9.4 MG/DL — SIGNIFICANT CHANGE UP (ref 8.4–10.5)
CHLORIDE SERPL-SCNC: 104 MMOL/L — SIGNIFICANT CHANGE UP (ref 96–108)
CO2 SERPL-SCNC: 25 MMOL/L — SIGNIFICANT CHANGE UP (ref 22–31)
CREAT SERPL-MCNC: 1.05 MG/DL — SIGNIFICANT CHANGE UP (ref 0.5–1.3)
EGFR: 90 ML/MIN/1.73M2 — SIGNIFICANT CHANGE UP
GLUCOSE SERPL-MCNC: 112 MG/DL — HIGH (ref 70–99)
HCT VFR BLD CALC: 39.4 % — SIGNIFICANT CHANGE UP (ref 39–50)
HGB BLD-MCNC: 13 G/DL — SIGNIFICANT CHANGE UP (ref 13–17)
MAGNESIUM SERPL-MCNC: 2.1 MG/DL — SIGNIFICANT CHANGE UP (ref 1.6–2.6)
MCHC RBC-ENTMCNC: 29.6 PG — SIGNIFICANT CHANGE UP (ref 27–34)
MCHC RBC-ENTMCNC: 33 G/DL — SIGNIFICANT CHANGE UP (ref 32–36)
MCV RBC AUTO: 89.7 FL — SIGNIFICANT CHANGE UP (ref 80–100)
NRBC # BLD: 0 /100 WBCS — SIGNIFICANT CHANGE UP (ref 0–0)
PHOSPHATE SERPL-MCNC: 4 MG/DL — SIGNIFICANT CHANGE UP (ref 2.5–4.5)
PLATELET # BLD AUTO: SIGNIFICANT CHANGE UP K/UL (ref 150–400)
POTASSIUM SERPL-MCNC: 4.1 MMOL/L — SIGNIFICANT CHANGE UP (ref 3.5–5.3)
POTASSIUM SERPL-SCNC: 4.1 MMOL/L — SIGNIFICANT CHANGE UP (ref 3.5–5.3)
RBC # BLD: 4.39 M/UL — SIGNIFICANT CHANGE UP (ref 4.2–5.8)
RBC # FLD: 12.7 % — SIGNIFICANT CHANGE UP (ref 10.3–14.5)
SODIUM SERPL-SCNC: 141 MMOL/L — SIGNIFICANT CHANGE UP (ref 135–145)
WBC # BLD: 5.77 K/UL — SIGNIFICANT CHANGE UP (ref 3.8–10.5)
WBC # FLD AUTO: 5.77 K/UL — SIGNIFICANT CHANGE UP (ref 3.8–10.5)

## 2024-12-05 PROCEDURE — 82330 ASSAY OF CALCIUM: CPT

## 2024-12-05 PROCEDURE — 84100 ASSAY OF PHOSPHORUS: CPT

## 2024-12-05 PROCEDURE — 83690 ASSAY OF LIPASE: CPT

## 2024-12-05 PROCEDURE — 81001 URINALYSIS AUTO W/SCOPE: CPT

## 2024-12-05 PROCEDURE — 80053 COMPREHEN METABOLIC PANEL: CPT

## 2024-12-05 PROCEDURE — 83605 ASSAY OF LACTIC ACID: CPT

## 2024-12-05 PROCEDURE — 84295 ASSAY OF SERUM SODIUM: CPT

## 2024-12-05 PROCEDURE — 74177 CT ABD & PELVIS W/CONTRAST: CPT | Mod: MC

## 2024-12-05 PROCEDURE — 85014 HEMATOCRIT: CPT

## 2024-12-05 PROCEDURE — 82435 ASSAY OF BLOOD CHLORIDE: CPT

## 2024-12-05 PROCEDURE — 83735 ASSAY OF MAGNESIUM: CPT

## 2024-12-05 PROCEDURE — 99285 EMERGENCY DEPT VISIT HI MDM: CPT | Mod: 25

## 2024-12-05 PROCEDURE — 85027 COMPLETE CBC AUTOMATED: CPT

## 2024-12-05 PROCEDURE — 99233 SBSQ HOSP IP/OBS HIGH 50: CPT

## 2024-12-05 PROCEDURE — 96375 TX/PRO/DX INJ NEW DRUG ADDON: CPT

## 2024-12-05 PROCEDURE — 87086 URINE CULTURE/COLONY COUNT: CPT

## 2024-12-05 PROCEDURE — 82947 ASSAY GLUCOSE BLOOD QUANT: CPT

## 2024-12-05 PROCEDURE — 36415 COLL VENOUS BLD VENIPUNCTURE: CPT

## 2024-12-05 PROCEDURE — 82803 BLOOD GASES ANY COMBINATION: CPT

## 2024-12-05 PROCEDURE — 85018 HEMOGLOBIN: CPT

## 2024-12-05 PROCEDURE — 84132 ASSAY OF SERUM POTASSIUM: CPT

## 2024-12-05 PROCEDURE — 96374 THER/PROPH/DIAG INJ IV PUSH: CPT

## 2024-12-05 PROCEDURE — 85025 COMPLETE CBC W/AUTO DIFF WBC: CPT

## 2024-12-05 PROCEDURE — 80048 BASIC METABOLIC PNL TOTAL CA: CPT

## 2024-12-05 RX ORDER — AMOXICILLIN/POTASSIUM CLAV 250-125 MG
1 TABLET ORAL ONCE
Refills: 0 | Status: COMPLETED | OUTPATIENT
Start: 2024-12-05 | End: 2024-12-05

## 2024-12-05 RX ADMIN — METRONIDAZOLE 100 MILLIGRAM(S): 500 TABLET ORAL at 05:20

## 2024-12-05 RX ADMIN — Medication 200 MILLIGRAM(S): at 05:20

## 2024-12-05 RX ADMIN — Medication 1 TABLET(S): at 09:20

## 2024-12-05 NOTE — DISCHARGE NOTE NURSING/CASE MANAGEMENT/SOCIAL WORK - FINANCIAL ASSISTANCE
[facility] provides services at a reduced cost to those who are determined to be eligible through [facility]’s financial assistance program. Information regarding [facility]’s financial assistance program can be found by going to [email or web address] or by calling [phone].

## 2024-12-05 NOTE — PROGRESS NOTE ADULT - ASSESSMENT
44M presenting with 2 days of abdominal pain, leukocytosis and CT findings consistent with acute complicated diverticulitis with small foci of extraluminal air. Pain controlled, HDS.     Plan:  - start clears pending downtrending WBC   - c/w cipro/flagyl   - IVF  - Cipro/flagyl  - Serial abdominal exams    Colorectal (Red) Surgery   [id number]  
44M presenting with 2 days of abdominal pain, leukocytosis and CT findings consistent with acute complicated diverticulitis with small foci of extraluminal air. Pain controlled, HDS.     Plan:  - tolerating LRD   - c/w cipro/flagyl. transition to augmentin on d/c   - d/c planning     Colorectal (Red) Surgery   [id number]

## 2024-12-05 NOTE — DISCHARGE NOTE NURSING/CASE MANAGEMENT/SOCIAL WORK - PATIENT PORTAL LINK FT
You can access the FollowMyHealth Patient Portal offered by [facility] by registering at the following website: [email or web address]. By joining [employer]’s FollowMyHealth portal, you will also be able to view your health information using other applications (apps) compatible with our system.

## 2024-12-05 NOTE — PROGRESS NOTE ADULT - SUBJECTIVE AND OBJECTIVE BOX
SURGERY DAILY PROGRESS NOTE     Interval:  JG     Subjective:   Pt seen and examined on AM rounds.   Feels better today. Pain controlled.   Passing gas. No BM.   No pain on urination.   ______________________________________________  OBJECTIVE:   Vital Signs Last 24 Hrs  T(C): 36.7 (05 Dec 2024 04:55), Max: 36.9 (04 Dec 2024 20:35)  T(F): 98.1 (05 Dec 2024 04:55), Max: 98.4 (04 Dec 2024 20:35)  HR: 70 (05 Dec 2024 04:55) (66 - 95)  BP: 101/67 (05 Dec 2024 04:55) (98/60 - 107/61)  BP(mean): --  ABP: --  ABP(mean): --  RR: 18 (05 Dec 2024 04:55) (18 - 18)  SpO2: 98% (05 Dec 2024 04:55) (96% - 98%)    O2 Parameters below as of 05 Dec 2024 04:55  Patient On (Oxygen Delivery Method): room air    I&O's Detail    03 Dec 2024 07:01  -  04 Dec 2024 07:00  --------------------------------------------------------  IN:    dextrose 5% + sodium chloride 0.45% w/ Additives: 1500 mL    IV PiggyBack: 200 mL    IV PiggyBack: 400 mL    Lactated Ringers: 900 mL  Total IN: 3000 mL    OUT:    Voided (mL): 2700 mL  Total OUT: 2700 mL    Total NET: 300 mL      04 Dec 2024 07:01  -  05 Dec 2024 06:09  --------------------------------------------------------  IN:    dextrose 5% + sodium chloride 0.45% w/ Additives: 1200 mL    IV PiggyBack: 100 mL    IV PiggyBack: 200 mL    Oral Fluid: 240 mL  Total IN: 1740 mL    OUT:  Total OUT: 0 mL    Total NET: 1740 mL      ___________________________________________  .  LABS:                         13.2   9.48  )-----------( 65       ( 04 Dec 2024 07:08 )             40.3     12-04    140  |  102  |  8   ----------------------------<  92  4.4   |  25  |  0.95    Ca    9.2      04 Dec 2024 07:08  Phos  3.7     12-04  Mg     2.2     12-04        Urinalysis Basic - ( 04 Dec 2024 07:08 )    Color: x / Appearance: x / SG: x / pH: x  Gluc: 92 mg/dL / Ketone: x  / Bili: x / Urobili: x   Blood: x / Protein: x / Nitrite: x   Leuk Esterase: x / RBC: x / WBC x   Sq Epi: x / Non Sq Epi: x / Bacteria: x            RADIOLOGY, EKG & ADDITIONAL TESTS: Reviewed.   Physical exam:  General: alert and oriented, NAD  Resp: airway patent, respirations unlabored  CVS: regular rate and rhythm  Abdomen: soft, improved tenderness to palpation in LLQ, non distended   Extremities: no edema  Skin: warm, dry, appropriate color

## 2024-12-05 NOTE — DISCHARGE NOTE NURSING/CASE MANAGEMENT/SOCIAL WORK - NSDCPEFALRISK_GEN_ALL_CORE
For information on Fall & Injury Prevention, visit: [email or web address] OR  [email or web address] OR  https://www.cdc.gov/steadi/patient.html

## 2024-12-12 ENCOUNTER — APPOINTMENT (OUTPATIENT)
Dept: COLORECTAL SURGERY | Facility: CLINIC | Age: 44
End: 2024-12-12
Payer: COMMERCIAL

## 2024-12-12 VITALS
SYSTOLIC BLOOD PRESSURE: 111 MMHG | TEMPERATURE: 98.1 F | DIASTOLIC BLOOD PRESSURE: 73 MMHG | RESPIRATION RATE: 16 BRPM | HEART RATE: 76 BPM | OXYGEN SATURATION: 99 % | HEIGHT: 68 IN | BODY MASS INDEX: 26.73 KG/M2 | WEIGHT: 176.4 LBS

## 2024-12-12 DIAGNOSIS — Z87.891 PERSONAL HISTORY OF NICOTINE DEPENDENCE: ICD-10-CM

## 2024-12-12 PROBLEM — K57.32 DIVERTICULITIS OF COLON: Status: ACTIVE | Noted: 2024-12-12

## 2024-12-12 PROCEDURE — 99243 OFF/OP CNSLTJ NEW/EST LOW 30: CPT

## 2024-12-12 RX ORDER — AMOXICILLIN AND CLAVULANATE POTASSIUM 875; 125 MG/1; MG/1
875-125 TABLET, COATED ORAL
Qty: 20 | Refills: 0 | Status: ACTIVE | COMMUNITY
Start: 2024-12-12 | End: 1900-01-01

## 2024-12-16 ENCOUNTER — APPOINTMENT (OUTPATIENT)
Dept: INTERNAL MEDICINE | Facility: CLINIC | Age: 44
End: 2024-12-16
Payer: COMMERCIAL

## 2024-12-16 ENCOUNTER — OUTPATIENT (OUTPATIENT)
Dept: OUTPATIENT SERVICES | Facility: HOSPITAL | Age: 44
LOS: 1 days | End: 2024-12-16
Payer: SELF-PAY

## 2024-12-16 VITALS
DIASTOLIC BLOOD PRESSURE: 70 MMHG | OXYGEN SATURATION: 98 % | SYSTOLIC BLOOD PRESSURE: 114 MMHG | HEART RATE: 63 BPM | WEIGHT: 174 LBS | HEIGHT: 68 IN | BODY MASS INDEX: 26.37 KG/M2

## 2024-12-16 DIAGNOSIS — Z98.890 OTHER SPECIFIED POSTPROCEDURAL STATES: Chronic | ICD-10-CM

## 2024-12-16 DIAGNOSIS — I10 ESSENTIAL (PRIMARY) HYPERTENSION: ICD-10-CM

## 2024-12-16 DIAGNOSIS — F17.210 NICOTINE DEPENDENCE, CIGARETTES, UNCOMPLICATED: ICD-10-CM

## 2024-12-16 DIAGNOSIS — K57.32 DIVERTICULITIS OF LARGE INTESTINE W/OUT PERFORATION OR ABSCESS W/OUT BLEEDING: ICD-10-CM

## 2024-12-16 DIAGNOSIS — T78.40XA ALLERGY, UNSPECIFIED, INITIAL ENCOUNTER: ICD-10-CM

## 2024-12-16 PROCEDURE — G2211 COMPLEX E/M VISIT ADD ON: CPT

## 2024-12-16 PROCEDURE — G0463: CPT

## 2024-12-16 PROCEDURE — 99214 OFFICE O/P EST MOD 30 MIN: CPT | Mod: GC

## 2024-12-16 RX ORDER — EPINEPHRINE 0.3 MG/.3ML
0.3 INJECTION INTRAMUSCULAR
Qty: 1 | Refills: 0 | Status: ACTIVE | COMMUNITY
Start: 2024-12-16 | End: 1900-01-01

## 2024-12-16 RX ORDER — CETIRIZINE HYDROCHLORIDE 10 MG/1
10 CAPSULE, LIQUID FILLED ORAL
Qty: 30 | Refills: 0 | Status: ACTIVE | COMMUNITY
Start: 2024-12-16 | End: 1900-01-01

## 2024-12-17 ENCOUNTER — NON-APPOINTMENT (OUTPATIENT)
Age: 44
End: 2024-12-17

## 2024-12-26 ENCOUNTER — RESULT REVIEW (OUTPATIENT)
Age: 44
End: 2024-12-26

## 2024-12-26 ENCOUNTER — OUTPATIENT (OUTPATIENT)
Dept: OUTPATIENT SERVICES | Facility: HOSPITAL | Age: 44
LOS: 1 days | End: 2024-12-26
Payer: COMMERCIAL

## 2024-12-26 ENCOUNTER — APPOINTMENT (OUTPATIENT)
Dept: CT IMAGING | Facility: CLINIC | Age: 44
End: 2024-12-26
Payer: MEDICAID

## 2024-12-26 DIAGNOSIS — Z98.890 OTHER SPECIFIED POSTPROCEDURAL STATES: Chronic | ICD-10-CM

## 2024-12-26 DIAGNOSIS — K57.32 DIVERTICULITIS OF LARGE INTESTINE WITHOUT PERFORATION OR ABSCESS WITHOUT BLEEDING: ICD-10-CM

## 2024-12-26 PROCEDURE — 74177 CT ABD & PELVIS W/CONTRAST: CPT | Mod: 26

## 2024-12-26 PROCEDURE — 74177 CT ABD & PELVIS W/CONTRAST: CPT
